# Patient Record
Sex: FEMALE | Race: WHITE | NOT HISPANIC OR LATINO | Employment: OTHER | URBAN - METROPOLITAN AREA
[De-identification: names, ages, dates, MRNs, and addresses within clinical notes are randomized per-mention and may not be internally consistent; named-entity substitution may affect disease eponyms.]

---

## 2017-12-13 ENCOUNTER — OFFICE VISIT (OUTPATIENT)
Dept: OBSTETRICS AND GYNECOLOGY | Facility: CLINIC | Age: 67
End: 2017-12-13

## 2017-12-13 VITALS
SYSTOLIC BLOOD PRESSURE: 102 MMHG | WEIGHT: 130 LBS | DIASTOLIC BLOOD PRESSURE: 60 MMHG | HEIGHT: 66 IN | BODY MASS INDEX: 20.89 KG/M2

## 2017-12-13 DIAGNOSIS — N83.202 CYSTS OF BOTH OVARIES: Primary | ICD-10-CM

## 2017-12-13 DIAGNOSIS — N83.201 CYSTS OF BOTH OVARIES: Primary | ICD-10-CM

## 2017-12-13 PROCEDURE — 99213 OFFICE O/P EST LOW 20 MIN: CPT | Performed by: OBSTETRICS & GYNECOLOGY

## 2017-12-13 RX ORDER — ATORVASTATIN CALCIUM 10 MG/1
10 TABLET, FILM COATED ORAL DAILY
COMMUNITY
End: 2018-01-08 | Stop reason: SDUPTHER

## 2017-12-13 RX ORDER — SERTRALINE HYDROCHLORIDE 100 MG/1
100 TABLET, FILM COATED ORAL DAILY
COMMUNITY
End: 2018-01-08 | Stop reason: SDUPTHER

## 2017-12-13 RX ORDER — ASPIRIN 81 MG/1
81 TABLET ORAL DAILY
COMMUNITY

## 2017-12-13 RX ORDER — PROPRANOLOL HYDROCHLORIDE 10 MG/1
10 TABLET ORAL 2 TIMES DAILY
COMMUNITY
End: 2018-01-08

## 2017-12-13 NOTE — PROGRESS NOTES
Subjective:    Patient Carin Rodriguez is a 67 y.o. female.   Chief Complaint   Patient presents with   • Ovarian Cyst     LT. OVARY. SEEN 6 WKS. AGO IN [place] FOR CYST.    CCThis patient presents having no pain no symptoms she is postmenopausal over 10 years had no bleeding and she presents with the history of having ovarian cysts on her ultrasound that they've been following for 6 months she apparently at her last visit had a new ovarian cyst  patient is unable to have a vaginal ultrasound because of the tight hymeneal ring is still present pelvic exam is the done and really could not appreciate any ovarian enlargement on either side uterus was small Camilla did a abdominal ultrasound the patient's bladder was not full but she did not see any large ovarian cysts on either side after long discussion with the patient I think what we will do is get a ultrasound at the hospital with the both abdominal and vaginal if possible that they would have a smaller probe patient is having some issues with vaginal dryness as far as just vaginal discomfort and some mild issues with the urinary urgency      HPI      The following portions of the patient's history were reviewed and updated as appropriate: allergies, current medications, past family history, past medical history, past social history, past surgical history and problem list.      Review of Systems   Constitutional: Negative.    HENT: Negative.    Eyes: Negative.    Respiratory: Negative.    Cardiovascular: Negative.    Gastrointestinal: Negative for abdominal distention, abdominal pain, anal bleeding, blood in stool, constipation, diarrhea, nausea, rectal pain and vomiting.   Endocrine: Negative for cold intolerance, heat intolerance, polydipsia, polyphagia and polyuria.   Genitourinary: Negative.  Negative for decreased urine volume, dyspareunia, dysuria, enuresis, flank pain, frequency, genital sores, hematuria, menstrual problem, pelvic pain, urgency, vaginal  bleeding, vaginal discharge and vaginal pain.   Musculoskeletal: Negative.    Skin: Negative.    Allergic/Immunologic: Negative.    Neurological: Negative.    Hematological: Negative for adenopathy. Does not bruise/bleed easily.   Psychiatric/Behavioral: Negative for agitation, confusion and sleep disturbance. The patient is not nervous/anxious.          Objective:      Physical Exam   Constitutional: She appears well-developed and well-nourished. She is not intubated.   HENT:   Head: Hair is normal.   Nose: Nose normal.   Mouth/Throat: Oropharynx is clear and moist.   Eyes: Conjunctivae are normal.   Neck: Normal carotid pulses and no JVD present. No tracheal tenderness, no spinous process tenderness and no muscular tenderness present. Carotid bruit is not present. No rigidity. No edema, no erythema and normal range of motion present. No thyroid mass and no thyromegaly present.   Cardiovascular: Normal rate, regular rhythm, S1 normal and normal heart sounds.  Exam reveals no gallop.    No murmur heard.  Pulmonary/Chest: Effort normal. No accessory muscle usage or stridor. No apnea, no tachypnea and no bradypnea. She is not intubated. No respiratory distress. She has no wheezes. She has no rales. She exhibits no tenderness. Right breast exhibits no inverted nipple, no mass, no nipple discharge, no skin change and no tenderness. Left breast exhibits no inverted nipple, no mass, no nipple discharge, no skin change and no tenderness.   Abdominal: Soft. Bowel sounds are normal. She exhibits no distension and no mass. There is no tenderness. There is no rebound and no guarding. No hernia.   Genitourinary: Vagina normal. Rectal exam shows no external hemorrhoid, no internal hemorrhoid, no fissure, no mass, no tenderness and anal tone normal. There is no rash, tenderness, lesion or injury on the right labia. There is no rash, tenderness, lesion or injury on the left labia. No erythema, tenderness or bleeding in the vagina.  No foreign body in the vagina. No signs of injury around the vagina. No vaginal discharge found.   Genitourinary Comments: Uterus has been removed   Musculoskeletal: She exhibits no edema or tenderness.        Right shoulder: She exhibits no tenderness, no swelling, no pain and no spasm.   Lymphadenopathy:        Head (right side): No submental, no submandibular, no tonsillar, no preauricular, no posterior auricular and no occipital adenopathy present.        Head (left side): No submental, no submandibular, no tonsillar, no preauricular, no posterior auricular and no occipital adenopathy present.     She has no cervical adenopathy.        Right cervical: No superficial cervical, no deep cervical and no posterior cervical adenopathy present.       Left cervical: No superficial cervical, no deep cervical and no posterior cervical adenopathy present.        Right axillary: No pectoral and no lateral adenopathy present.        Left axillary: No pectoral and no lateral adenopathy present.       Right: No inguinal, no supraclavicular and no epitrochlear adenopathy present.        Left: No inguinal, no supraclavicular and no epitrochlear adenopathy present.   Neurological: No cranial nerve deficit. Coordination normal.   Skin: Skin is warm and dry. No abrasion, no bruising, no burn, no lesion, no petechiae, no purpura and no rash noted. Rash is not macular, not maculopapular, not nodular and not urticarial. No cyanosis or erythema. No pallor. Nails show no clubbing.   Psychiatric: She has a normal mood and affect. Her behavior is normal.         Assessment and Plan:  Long discussion with the patient concerning the ovarian cysts which the patient the has been following with her gynecologist in [place] exam today did not reveal any ovarian enlargement and after long discussion with decided to get a ultrasound at the hospital to see if we can determine what the status of the ovarian cyst is and then make a plan after  reviewing the chart on follow-up patient's given Premarin vaginal cream to use 1 drop daily and told to use a and D ointment with the cream in the morning and the ointment at night patient has mild sugar issues she is on propanolol sertraline atorvastatin and aspirin and has had no other major issues    Patient has been instructed to perform a self breast exam on a weekly basis, a yearly mammogram, pap smear yearly unless instructed otherwise and bone density every 2 years.  I recommended that the patient not smoke, and discussed smoking cessation when appropriate.     Carin was seen today for ovarian cyst.    Diagnoses and all orders for this visit:    Cysts of both ovaries  -     US Pelvis Complete

## 2017-12-18 DIAGNOSIS — N93.9 ABNORMAL VAGINAL BLEEDING: Primary | ICD-10-CM

## 2017-12-19 ENCOUNTER — OFFICE VISIT (OUTPATIENT)
Dept: GASTROENTEROLOGY | Facility: CLINIC | Age: 67
End: 2017-12-19

## 2017-12-19 VITALS
DIASTOLIC BLOOD PRESSURE: 68 MMHG | TEMPERATURE: 97.9 F | BODY MASS INDEX: 30.09 KG/M2 | WEIGHT: 130 LBS | HEIGHT: 55 IN | SYSTOLIC BLOOD PRESSURE: 116 MMHG

## 2017-12-19 DIAGNOSIS — K52.839 MICROSCOPIC COLITIS, UNSPECIFIED MICROSCOPIC COLITIS TYPE: Primary | ICD-10-CM

## 2017-12-19 PROCEDURE — 99204 OFFICE O/P NEW MOD 45 MIN: CPT | Performed by: INTERNAL MEDICINE

## 2017-12-19 RX ORDER — CHOLESTYRAMINE 4 G/9G
4 POWDER, FOR SUSPENSION ORAL DAILY
Qty: 378 G | Refills: 5 | Status: SHIPPED | OUTPATIENT
Start: 2017-12-19 | End: 2019-07-24 | Stop reason: SDUPTHER

## 2017-12-19 NOTE — PROGRESS NOTES
Chief Complaint   Patient presents with   • microscopic colitis     Subjective      HPI     Carin Rodriguez is a 67 y.o. female who presents for evaluation of microscopic colitis.  Pt was initially diagnosed about 3-4 years ago.  Most recent colonoscopy in March 2016 (she thinks this was normal).    She has been on multiple cycles of Entacort.  She is currently taking Entacort 3mg/day, but has been on gradual taper.  She has flares every few months even on this regimen.  She states these typically last 1-2 days.  She is on metformin for DM which she has been on for last year. Weight has been stable.      Prior GI MD is:    Edilberto Fitzgerald  [address] [place] [id number]  [phone]      Past Medical History:   Diagnosis Date   • Diabetes mellitus    • Hyperlipidemia    • Microscopic colitis        Current Outpatient Prescriptions:   •  aspirin 81 MG EC tablet, Take 81 mg by mouth Daily., Disp: , Rfl:   •  atorvastatin (LIPITOR) 10 MG tablet, Take 10 mg by mouth Daily., Disp: , Rfl:   •  BUDESONIDE PO, Take  by mouth., Disp: , Rfl:   •  metFORMIN (GLUCOPHAGE) 1000 MG tablet, Take 1,000 mg by mouth 2 (Two) Times a Day With Meals., Disp: , Rfl:   •  propranolol (INDERAL) 10 MG tablet, Take 10 mg by mouth 2 (Two) Times a Day., Disp: , Rfl:   •  sertraline (ZOLOFT) 100 MG tablet, Take 100 mg by mouth Daily., Disp: , Rfl:   •  Bismuth Subsalicylate 262 MG tablet, Take 262 mg by mouth 3 (Three) Times a Day As Needed (for diarrhea)., Disp: 90 each, Rfl: 3  •  cholestyramine (QUESTRAN) 4 GM/DOSE powder, Take 1 packet by mouth Daily. mixed with a liquid, Disp: 378 g, Rfl: 5     Allergies   Allergen Reactions   • Clindamycin/Lincomycin      Social History     Social History   • Marital status:      Spouse name: N/A   • Number of children: N/A   • Years of education: N/A     Occupational History   • Not on file.     Social History Main Topics   • Smoking status: Never Smoker   • Smokeless tobacco: Never Used   •  "Alcohol use Yes      Comment: social   • Drug use: No   • Sexual activity: Not on file     Other Topics Concern   • Not on file     Social History Narrative     History reviewed. No pertinent family history.     Review of Systems   Constitutional: Negative.    Respiratory: Negative.    Cardiovascular: Negative.    Gastrointestinal: Positive for diarrhea.   All other systems reviewed and are negative.     Objective   Vitals:    12/19/17 1400   BP: 116/68   Temp: 97.9 °F (36.6 °C)     Physical Exam   Constitutional: She is oriented to person, place, and time. She appears well-developed and well-nourished.   HENT:   Head: Normocephalic and atraumatic.   Mouth/Throat: Oropharynx is clear and moist.   Eyes: EOM are normal. No scleral icterus.   Neck: Normal range of motion. Neck supple. No thyromegaly present.   Cardiovascular: Normal rate, regular rhythm and normal heart sounds.  Exam reveals no gallop and no friction rub.    No murmur heard.  Pulmonary/Chest: Effort normal and breath sounds normal. She has no wheezes. She has no rales. She exhibits no tenderness.   Abdominal: Soft. Bowel sounds are normal. She exhibits no distension. There is no tenderness. There is no rebound and no guarding. No hernia.   Musculoskeletal: Normal range of motion. She exhibits no edema.   Lymphadenopathy:     She has no cervical adenopathy.   Neurological: She is alert and oriented to person, place, and time.   Skin: Skin is warm and dry.   Psychiatric: She has a normal mood and affect. Judgment and thought content normal.   Vitals reviewed.       Assessment/Plan   Assessment:     1. Microscopic colitis, unspecified microscopic colitis type      Plan:   Pt reports hx of microscopic colitis treated with intermittent budesonide tapers.  She never been tried on any other therapies.  I am not convinced that the \"flares\" that she describes are truly related to microscopic colitis, as these seem to be rather short lived and intermittent.  She " also reports her last colonoscopy last year was normal.      I have suggested we wean her budesonide, and will start questran - initially once/day with option to increase to BID if she has frequent diarrhea on this therapy.  I have also given her rx for as needed bismuth tablets.  I have requested records from her prior GI MD.          Paul Mcmanus M.D.  [facility] ([facility]  [address] [place] [address]  Office: [phone]

## 2018-01-08 ENCOUNTER — OFFICE VISIT (OUTPATIENT)
Dept: FAMILY MEDICINE CLINIC | Facility: CLINIC | Age: 68
End: 2018-01-08

## 2018-01-08 VITALS
TEMPERATURE: 98 F | DIASTOLIC BLOOD PRESSURE: 68 MMHG | OXYGEN SATURATION: 95 % | BODY MASS INDEX: 21.66 KG/M2 | WEIGHT: 130 LBS | SYSTOLIC BLOOD PRESSURE: 110 MMHG | HEART RATE: 65 BPM | HEIGHT: 65 IN | RESPIRATION RATE: 16 BRPM

## 2018-01-08 DIAGNOSIS — F33.42 RECURRENT MAJOR DEPRESSIVE DISORDER, IN FULL REMISSION (HCC): ICD-10-CM

## 2018-01-08 DIAGNOSIS — E04.9 THYROID GOITER: ICD-10-CM

## 2018-01-08 DIAGNOSIS — R73.01 IMPAIRED FASTING GLUCOSE: Primary | ICD-10-CM

## 2018-01-08 DIAGNOSIS — I34.1 MVP (MITRAL VALVE PROLAPSE): ICD-10-CM

## 2018-01-08 DIAGNOSIS — E78.2 MIXED HYPERLIPIDEMIA: ICD-10-CM

## 2018-01-08 DIAGNOSIS — E55.9 VITAMIN D DEFICIENCY: ICD-10-CM

## 2018-01-08 PROBLEM — F32.A DEPRESSION: Status: ACTIVE | Noted: 2018-01-08

## 2018-01-08 PROBLEM — E78.5 HYPERLIPIDEMIA: Status: ACTIVE | Noted: 2018-01-08

## 2018-01-08 PROCEDURE — 99214 OFFICE O/P EST MOD 30 MIN: CPT | Performed by: PHYSICIAN ASSISTANT

## 2018-01-08 RX ORDER — SERTRALINE HYDROCHLORIDE 100 MG/1
100 TABLET, FILM COATED ORAL DAILY
Qty: 90 TABLET | Refills: 1 | Status: SHIPPED | OUTPATIENT
Start: 2018-01-08 | End: 2018-05-09 | Stop reason: SDUPTHER

## 2018-01-08 RX ORDER — PROPRANOLOL HCL 60 MG
60 CAPSULE, EXTENDED RELEASE 24HR ORAL DAILY
Qty: 90 CAPSULE | Refills: 1 | Status: SHIPPED | OUTPATIENT
Start: 2018-01-08 | End: 2018-05-09 | Stop reason: SDUPTHER

## 2018-01-08 RX ORDER — ATORVASTATIN CALCIUM 10 MG/1
10 TABLET, FILM COATED ORAL DAILY
Qty: 90 TABLET | Refills: 1 | Status: SHIPPED | OUTPATIENT
Start: 2018-01-08 | End: 2018-05-09 | Stop reason: SDUPTHER

## 2018-01-08 NOTE — PATIENT INSTRUCTIONS
Low glycemic index diet  Exercise 30 minutes most days of the week  Make sure you get results on any labs or tests we ordered today  We discussed medications and how to take them as prescribed  Sleep 6-8 hours each night if possible  If you have not signed up for [employer], please activate your code ASAP from your After Visit Summary today    LDL goal <100  LDL goal if heart disease <70  HDL goal >60  Triglyceride goal <150  BP goal =<130/80  Fasting glucose <100

## 2018-01-08 NOTE — PROGRESS NOTES
Subjective   Carin Rodriguez is a 67 y.o. female.     History of Present Illness   Carin Rodriguez 67 y.o. female who presents today for a new patient appointment.    she has a history of   Patient Active Problem List   Diagnosis   • Impaired fasting glucose   • Depression   • Hyperlipidemia   • Thyroid goiter   • MVP (mitral valve prolapse)   .  she is here to establish care I reviewed the PFSH recorded today by my MA/LPN staff.   she   She has been feeling well.    She has UTI and finishing Cipro;  Blood on urine dip and will get f/u urine  Hx of SVT and is on Propranolol with ASA;  No SVT for years; no ablation    Carin Rodriguez 67 y.o. female who presents today for routine follow up check and medication refills.  she has a history of   Patient Active Problem List   Diagnosis   • Impaired fasting glucose   • Depression   • Hyperlipidemia   • Thyroid goiter   • MVP (mitral valve prolapse)   .  She is new today, she has overall felt well.  She has has been having elevated blood pressure readings and here to evaluate this, Impaired fasting glucose and will continue close lab follow up to watch for DMII, Hyperlipidemia and here to discuss treatment and Vitamin D deficiency and well controlled on medication and labs at goal >30.  she has been compliant with current medications have reviewed them.  The patient denies medication side effects.  Doing better on new GI Rx Questran    Results for orders placed or performed during the hospital encounter of 01/01/18   Urine Culture - Urine, Urine, Clean Catch   Result Value Ref Range    Urine Culture Final report (A)     Result 1 Citrobacter youngae (A)     Susceptibility Testing Comment    POCT Urinalysis (automated dipstick)   Result Value Ref Range    Color Dark Yellow Yellow, Straw, Dark Yellow, Doretha    Clarity, UA Cloudy (A) Clear    Glucose, UA Negative Negative, 1000 mg/dL (3+) mg/dL    Bilirubin Negative Negative    Ketones, UA Negative Negative    Specific Gravity   1.010 1.005 - 1.030    Blood, UA Large (A) Negative    pH, Urine 6.0 5.0 - 8.0    Protein, POC Trace (A) Negative mg/dL    Urobilinogen, UA Normal Normal    Leukocytes Moderate (2+) (A) Negative    Nitrite, UA Negative Negative     Hx thyroid goiter 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  Old records have MVP noted    Seeing GI and GYN  I saw records from GI, GYN, ortho only  Well controlled on Zoloft for years for depression.     She is not exercising; no sleep apnea    She has had one pneumonia vaccine  Will get thyroid update next Oct    The following portions of the patient's history were reviewed and updated as appropriate: allergies, current medications, past family history, past medical history, past social history, past surgical history and problem list.    Review of Systems   Constitutional: Negative for activity change, appetite change and unexpected weight change.   HENT: Negative for nosebleeds and trouble swallowing.    Eyes: Negative for pain and visual disturbance.   Respiratory: Negative for chest tightness, shortness of breath and wheezing.    Cardiovascular: Negative for chest pain and palpitations.   Gastrointestinal: Negative for abdominal pain and blood in stool.   Endocrine: Negative.    Genitourinary: Negative for difficulty urinating and hematuria.   Musculoskeletal: Negative for joint swelling.   Skin: Negative for color change and rash.   Allergic/Immunologic: Negative.    Neurological: Negative for syncope and speech difficulty.   Hematological: Negative for adenopathy.   Psychiatric/Behavioral: Negative for agitation and confusion.   All other systems reviewed and are negative.      Objective   Physical Exam   Constitutional: She is oriented to person, place, and time. She appears well-developed and well-nourished. No distress.   HENT:   Head: Normocephalic and atraumatic.   Right Ear: External ear normal.   Left Ear: External ear normal.   Nose: Nose normal.    Mouth/Throat: Oropharynx is clear and moist. No oropharyngeal exudate.   Eyes: Conjunctivae and EOM are normal. Pupils are equal, round, and reactive to light. No scleral icterus.   Neck: Normal range of motion. Neck supple. Thyromegaly present.   Cardiovascular: Normal rate, regular rhythm, normal heart sounds and intact distal pulses.    No murmur heard.  Pulmonary/Chest: Effort normal and breath sounds normal. No respiratory distress. She has no wheezes. She has no rales.   Abdominal: Soft.   Musculoskeletal: Normal range of motion. She exhibits no deformity.   Lymphadenopathy:     She has no cervical adenopathy.   Neurological: She is alert and oriented to person, place, and time. Coordination normal.   Skin: Skin is warm and dry.   Psychiatric: She has a normal mood and affect. Her behavior is normal. Judgment and thought content normal.   Nursing note and vitals reviewed.      Assessment/Plan   Carin was seen today for establish care and depression.    Diagnoses and all orders for this visit:    Impaired fasting glucose  -     Comprehensive metabolic panel  -     Lipid panel  -     CBC and Differential  -     TSH  -     T4, Free  -     Vitamin B12  -     Folate  -     Vitamin D 25 Hydroxy  -     Urinalysis With Microscopic - Urine, Clean Catch  -     Hemoglobin A1c  -     Hepatitis C Antibody    Recurrent major depressive disorder, in full remission  -     Comprehensive metabolic panel  -     Lipid panel  -     CBC and Differential  -     TSH  -     T4, Free  -     Vitamin B12  -     Folate  -     Vitamin D 25 Hydroxy  -     Urinalysis With Microscopic - Urine, Clean Catch  -     Hemoglobin A1c  -     Hepatitis C Antibody    Mixed hyperlipidemia  -     Comprehensive metabolic panel  -     Lipid panel  -     CBC and Differential  -     TSH  -     T4, Free  -     Vitamin B12  -     Folate  -     Vitamin D 25 Hydroxy  -     Urinalysis With Microscopic - Urine, Clean Catch  -     Hemoglobin A1c  -      Hepatitis C Antibody    Thyroid goiter  -     Ambulatory Referral to ENT (Otolaryngology)  -     Comprehensive metabolic panel  -     Lipid panel  -     CBC and Differential  -     TSH  -     T4, Free  -     Vitamin B12  -     Folate  -     Vitamin D 25 Hydroxy  -     Urinalysis With Microscopic - Urine, Clean Catch  -     Hemoglobin A1c  -     Hepatitis C Antibody    MVP (mitral valve prolapse)  -     Comprehensive metabolic panel  -     Lipid panel  -     CBC and Differential  -     TSH  -     T4, Free  -     Vitamin B12  -     Folate  -     Vitamin D 25 Hydroxy  -     Urinalysis With Microscopic - Urine, Clean Catch  -     Hemoglobin A1c  -     Hepatitis C Antibody  -     Adult Transthoracic Echo Complete W/ Cont if Necessary Per Protocol; Future    Vitamin D deficiency  -     Comprehensive metabolic panel  -     Lipid panel  -     CBC and Differential  -     TSH  -     T4, Free  -     Vitamin B12  -     Folate  -     Vitamin D 25 Hydroxy  -     Urinalysis With Microscopic - Urine, Clean Catch  -     Hemoglobin A1c  -     Hepatitis C Antibody    Other orders  -     propranolol LA (INDERAL LA) 60 MG 24 hr capsule; Take 1 capsule by mouth Daily. For heart (put on file)  -     sertraline (ZOLOFT) 100 MG tablet; Take 1 tablet by mouth Daily. For depression; (put on file)  -     atorvastatin (LIPITOR) 10 MG tablet; Take 1 tablet by mouth Daily. For cholesterol;(put on file)  -     metFORMIN (GLUCOPHAGE) 1000 MG tablet; Take 1 tablet by mouth 2 (Two) Times a Day With Meals. For impaired glucose; (put on file)

## 2018-01-17 ENCOUNTER — APPOINTMENT (OUTPATIENT)
Dept: CARDIOLOGY | Facility: HOSPITAL | Age: 68
End: 2018-01-17

## 2018-01-24 ENCOUNTER — OFFICE VISIT (OUTPATIENT)
Dept: GASTROENTEROLOGY | Facility: CLINIC | Age: 68
End: 2018-01-24

## 2018-01-24 VITALS
TEMPERATURE: 97.7 F | WEIGHT: 133.2 LBS | HEIGHT: 65 IN | DIASTOLIC BLOOD PRESSURE: 72 MMHG | BODY MASS INDEX: 22.19 KG/M2 | SYSTOLIC BLOOD PRESSURE: 118 MMHG

## 2018-01-24 DIAGNOSIS — K58.0 IRRITABLE BOWEL SYNDROME WITH DIARRHEA: Primary | ICD-10-CM

## 2018-01-24 PROCEDURE — 99213 OFFICE O/P EST LOW 20 MIN: CPT | Performed by: INTERNAL MEDICINE

## 2018-01-24 NOTE — PROGRESS NOTES
Chief Complaint   Patient presents with   • microscopic colitis     Subjective     HPI     Carin Rodriguez is a 67 y.o. female who presents Today for follow-up.  This patient was seen as a new consult a few months ago after relocating to the area needing to establish GI care.  She reported a history of microscopic colitis and has been on off and on budesonide tapers over the last few years.  I did eventually received and reviewed records from her prior gastroenterologist in [place].  The patient most recently had upper and lower endoscopy in March 2016 due to symptoms of ongoing diarrhea and concern for active microscopic colitis.  I reviewed those reports in both the EGD and colonoscopy were normal both macro-and microscopically.  There was no evidence of active microscopic colitis.    At her last visit I had the patient taper off her budesonide and she was started on cholestyramine with as needed Pepto-Bismol.  She tells me that she's been doing relatively well on this regimen.  She still has some intermittent loose stool requiring use of a undergarment pad but this seems to be a somewhat less frequent.  Her weight remains stable.  I did again discuss with the patient that she is on metformin and loose stool can be a very common side effect from this medication.    Past Medical History:   Diagnosis Date   • Depression    • Heart palpitations    • Hyperlipidemia    • Microscopic colitis        Social History     Social History   • Marital status:      Spouse name: N/A   • Number of children: N/A   • Years of education: N/A     Social History Main Topics   • Smoking status: Never Smoker   • Smokeless tobacco: Never Used   • Alcohol use Yes      Comment: social   • Drug use: No   • Sexual activity: Not Asked     Other Topics Concern   • None     Social History Narrative       Current Outpatient Prescriptions:   •  aspirin 81 MG EC tablet, Take 81 mg by mouth Daily., Disp: , Rfl:   •  atorvastatin  (LIPITOR) 10 MG tablet, Take 1 tablet by mouth Daily. For cholesterol;(put on file), Disp: 90 tablet, Rfl: 1  •  Bismuth Subsalicylate 262 MG tablet, Take 262 mg by mouth 3 (Three) Times a Day As Needed (for diarrhea)., Disp: 90 each, Rfl: 3  •  cholestyramine (QUESTRAN) 4 GM/DOSE powder, Take 1 packet by mouth Daily. mixed with a liquid, Disp: 378 g, Rfl: 5  •  metFORMIN (GLUCOPHAGE) 1000 MG tablet, Take 1 tablet by mouth 2 (Two) Times a Day With Meals. For impaired glucose; (put on file), Disp: 180 tablet, Rfl: 1  •  Multiple Vitamins-Minerals (MULTIVITAMIN ADULT PO), Take  by mouth., Disp: , Rfl:   •  propranolol LA (INDERAL LA) 60 MG 24 hr capsule, Take 1 capsule by mouth Daily. For heart (put on file), Disp: 90 capsule, Rfl: 1  •  sertraline (ZOLOFT) 100 MG tablet, Take 1 tablet by mouth Daily. For depression; (put on file), Disp: 90 tablet, Rfl: 1  •  BUDESONIDE PO, Take  by mouth., Disp: , Rfl:   •  ciprofloxacin (CIPRO) 250 MG tablet, Take 1 tablet by mouth Every 12 (Twelve) Hours., Disp: 10 tablet, Rfl: 0  •  rifaximin (XIFAXAN) 550 MG tablet, Take 1 tablet by mouth Every 8 (Eight) Hours for 14 days., Disp: 42 tablet, Rfl: 0    Review of Systems   Constitutional: Negative.    Cardiovascular: Negative.    Gastrointestinal: Positive for diarrhea.       Objective   Vitals:    01/24/18 0920   BP: 118/72   Temp: 97.7 °F (36.5 °C)       Physical Exam   Constitutional: She is oriented to person, place, and time. She appears well-developed and well-nourished.   HENT:   Head: Normocephalic and atraumatic.   Abdominal: Soft. Bowel sounds are normal. She exhibits no distension and no mass. There is no tenderness. No hernia.   Neurological: She is alert and oriented to person, place, and time.   Skin: Skin is warm and dry.   Psychiatric: She has a normal mood and affect. Her behavior is normal. Judgment and thought content normal.   Vitals reviewed.      Assessment/Plan   Assessment:     1. Irritable bowel syndrome  with diarrhea    2.      Remote hx of microscopic colitis (normal random bx in 2016)    Plan:   Trail fo xifaxin tid x 14 days  Continue questran and as needed pepto  Consider discontinuation of metformin  Screening colonoscopy due 2026    Follow up 3 mos        Paul Mcmanus M.D.  [facility]  [address] [place] [address]  Office: [phone]

## 2018-01-25 ENCOUNTER — HOSPITAL ENCOUNTER (OUTPATIENT)
Dept: CARDIOLOGY | Facility: HOSPITAL | Age: 68
Discharge: HOME | End: 2018-01-25
Admitting: PHYSICIAN ASSISTANT

## 2018-01-25 ENCOUNTER — TELEPHONE (OUTPATIENT)
Dept: GASTROENTEROLOGY | Facility: CLINIC | Age: 68
End: 2018-01-25

## 2018-01-25 VITALS
HEIGHT: 65 IN | WEIGHT: 133 LBS | SYSTOLIC BLOOD PRESSURE: 112 MMHG | HEART RATE: 64 BPM | DIASTOLIC BLOOD PRESSURE: 60 MMHG | BODY MASS INDEX: 22.16 KG/M2

## 2018-01-25 DIAGNOSIS — I34.1 MVP (MITRAL VALVE PROLAPSE): ICD-10-CM

## 2018-01-25 LAB
ASCENDING AORTA: 2.7 CM
BH CV ECHO MEAS - ACS: 2 CM
BH CV ECHO MEAS - AO MAX PG (FULL): 2.2 MMHG
BH CV ECHO MEAS - AO MAX PG: 6.6 MMHG
BH CV ECHO MEAS - AO MEAN PG (FULL): 1.3 MMHG
BH CV ECHO MEAS - AO MEAN PG: 3.5 MMHG
BH CV ECHO MEAS - AO ROOT AREA (BSA CORRECTED): 1.9
BH CV ECHO MEAS - AO ROOT AREA: 7.8 CM^2
BH CV ECHO MEAS - AO ROOT DIAM: 3.1 CM
BH CV ECHO MEAS - AO V2 MAX: 128 CM/SEC
BH CV ECHO MEAS - AO V2 MEAN: 85.7 CM/SEC
BH CV ECHO MEAS - AO V2 VTI: 30.8 CM
BH CV ECHO MEAS - AVA(I,A): 1.9 CM^2
BH CV ECHO MEAS - AVA(I,D): 1.9 CM^2
BH CV ECHO MEAS - AVA(V,A): 2 CM^2
BH CV ECHO MEAS - AVA(V,D): 2 CM^2
BH CV ECHO MEAS - BSA(HAYCOCK): 1.6 M^2
BH CV ECHO MEAS - BSA: 1.6 M^2
BH CV ECHO MEAS - BZI_BMI: 21.3 KILOGRAMS/M^2
BH CV ECHO MEAS - BZI_METRIC_HEIGHT: 165.1 CM
BH CV ECHO MEAS - BZI_METRIC_WEIGHT: 58.1 KG
BH CV ECHO MEAS - CONTRAST EF 4CH: 62.7 ML/M^2
BH CV ECHO MEAS - EDV(MOD-SP4): 51 ML
BH CV ECHO MEAS - EDV(TEICH): 95.9 ML
BH CV ECHO MEAS - EF(CUBED): 74 %
BH CV ECHO MEAS - EF(MOD-SP4): 62.7 %
BH CV ECHO MEAS - EF(TEICH): 65.9 %
BH CV ECHO MEAS - ESV(MOD-SP4): 19 ML
BH CV ECHO MEAS - ESV(TEICH): 32.7 ML
BH CV ECHO MEAS - FS: 36.1 %
BH CV ECHO MEAS - IVS/LVPW: 1.1
BH CV ECHO MEAS - IVSD: 1 CM
BH CV ECHO MEAS - LAT PEAK E' VEL: 9 CM/SEC
BH CV ECHO MEAS - LV DIASTOLIC VOL/BSA (35-75): 31.2 ML/M^2
BH CV ECHO MEAS - LV MASS(C)D: 148.7 GRAMS
BH CV ECHO MEAS - LV MASS(C)DI: 90.9 GRAMS/M^2
BH CV ECHO MEAS - LV MAX PG: 4.3 MMHG
BH CV ECHO MEAS - LV MEAN PG: 2.2 MMHG
BH CV ECHO MEAS - LV SYSTOLIC VOL/BSA (12-30): 11.6 ML/M^2
BH CV ECHO MEAS - LV V1 MAX: 103.8 CM/SEC
BH CV ECHO MEAS - LV V1 MEAN: 68.6 CM/SEC
BH CV ECHO MEAS - LV V1 VTI: 23.7 CM
BH CV ECHO MEAS - LVIDD: 4.6 CM
BH CV ECHO MEAS - LVIDS: 2.9 CM
BH CV ECHO MEAS - LVLD AP4: 6.4 CM
BH CV ECHO MEAS - LVLS AP4: 5.5 CM
BH CV ECHO MEAS - LVOT AREA (M): 2.5 CM^2
BH CV ECHO MEAS - LVOT AREA: 2.5 CM^2
BH CV ECHO MEAS - LVOT DIAM: 1.8 CM
BH CV ECHO MEAS - LVPWD: 0.92 CM
BH CV ECHO MEAS - MED PEAK E' VEL: 8 CM/SEC
BH CV ECHO MEAS - MV A DUR: 0.11 SEC
BH CV ECHO MEAS - MV A MAX VEL: 84.9 CM/SEC
BH CV ECHO MEAS - MV DEC SLOPE: 415.6 CM/SEC^2
BH CV ECHO MEAS - MV DEC TIME: 0.21 SEC
BH CV ECHO MEAS - MV E MAX VEL: 91.7 CM/SEC
BH CV ECHO MEAS - MV E/A: 1.1
BH CV ECHO MEAS - MV MAX PG: 4.4 MMHG
BH CV ECHO MEAS - MV MEAN PG: 2 MMHG
BH CV ECHO MEAS - MV P1/2T MAX VEL: 89.2 CM/SEC
BH CV ECHO MEAS - MV P1/2T: 62.9 MSEC
BH CV ECHO MEAS - MV V2 MAX: 104.7 CM/SEC
BH CV ECHO MEAS - MV V2 MEAN: 67.4 CM/SEC
BH CV ECHO MEAS - MV V2 VTI: 33 CM
BH CV ECHO MEAS - MVA P1/2T LCG: 2.5 CM^2
BH CV ECHO MEAS - MVA(P1/2T): 3.5 CM^2
BH CV ECHO MEAS - MVA(VTI): 1.8 CM^2
BH CV ECHO MEAS - PA MAX PG (FULL): 2.8 MMHG
BH CV ECHO MEAS - PA MAX PG: 4.3 MMHG
BH CV ECHO MEAS - PA V2 MAX: 103.7 CM/SEC
BH CV ECHO MEAS - PULM A REVS DUR: 0.12 SEC
BH CV ECHO MEAS - PULM A REVS VEL: 37.3 CM/SEC
BH CV ECHO MEAS - PULM DIAS VEL: 51.4 CM/SEC
BH CV ECHO MEAS - PULM S/D: 1.4
BH CV ECHO MEAS - PULM SYS VEL: 72.3 CM/SEC
BH CV ECHO MEAS - PVA(V,A): 3 CM^2
BH CV ECHO MEAS - PVA(V,D): 3 CM^2
BH CV ECHO MEAS - QP/QS: 1.2
BH CV ECHO MEAS - RAP SYSTOLE: 3 MMHG
BH CV ECHO MEAS - RV MAX PG: 1.5 MMHG
BH CV ECHO MEAS - RV MEAN PG: 0.76 MMHG
BH CV ECHO MEAS - RV V1 MAX: 61.6 CM/SEC
BH CV ECHO MEAS - RV V1 MEAN: 38.9 CM/SEC
BH CV ECHO MEAS - RV V1 VTI: 14.4 CM
BH CV ECHO MEAS - RVOT AREA: 5 CM^2
BH CV ECHO MEAS - RVOT DIAM: 2.5 CM
BH CV ECHO MEAS - RVSP: 17 MMHG
BH CV ECHO MEAS - SI(AO): 146.1 ML/M^2
BH CV ECHO MEAS - SI(CUBED): 43.2 ML/M^2
BH CV ECHO MEAS - SI(LVOT): 36.2 ML/M^2
BH CV ECHO MEAS - SI(MOD-SP4): 19.6 ML/M^2
BH CV ECHO MEAS - SI(TEICH): 38.6 ML/M^2
BH CV ECHO MEAS - SUP REN AO DIAM: 2 CM
BH CV ECHO MEAS - SV(AO): 239.1 ML
BH CV ECHO MEAS - SV(CUBED): 70.6 ML
BH CV ECHO MEAS - SV(LVOT): 59.2 ML
BH CV ECHO MEAS - SV(MOD-SP4): 32 ML
BH CV ECHO MEAS - SV(RVOT): 71.7 ML
BH CV ECHO MEAS - SV(TEICH): 63.2 ML
BH CV ECHO MEAS - TAPSE (>1.6): 2.4 CM2
BH CV ECHO MEAS - TR MAX VEL: 186.2 CM/SEC
BH CV XLRA - RV BASE: 2.5 CM
BH CV XLRA - TDI S': 15 CM/SEC
E/E' RATIO: 11
LEFT ATRIUM VOLUME INDEX: 22 ML/M2
LV EF 2D ECHO EST: 63 %
SINUS: 2.7 CM
STJ: 2.9 CM

## 2018-01-25 PROCEDURE — 93306 TTE W/DOPPLER COMPLETE: CPT

## 2018-01-25 PROCEDURE — 93306 TTE W/DOPPLER COMPLETE: CPT | Performed by: INTERNAL MEDICINE

## 2018-01-25 NOTE — TELEPHONE ENCOUNTER
Returned patient's phone call. She states Xifaxan will cost her $600 oop. Questions if there is another medication she can take which will be cheaper. Advised an update will be sent to Dr. Perez. She verb understanding.

## 2018-01-25 NOTE — TELEPHONE ENCOUNTER
----- Message from Drake Brafdord sent at 1/25/2018 12:39 PM EST -----  Regarding: PT CALLED ABOUT MEDS   Contact: [phone]   PT IS CALLING STATING THE MEDICATION rifaximin (XIFAXAN) 550 MG tablet IS WAY TO EXPENSIVE FOR THE PT TO GET.

## 2018-01-26 NOTE — TELEPHONE ENCOUNTER
Called the Provider Prior Authorization Line and spoke with representative. PA for tier exception submitted. We will hear back via fax within 72 hours (weekend days included).     Called pt and advised of the above. She verb understanding.

## 2018-01-26 NOTE — TELEPHONE ENCOUNTER
Called pt and advised that per Dr Mcmanus: there is not a good alternative to Xifaxan. Advised that MD recommends she can try over the counter IBgard is she has not tried that medication yet. Pt verb understanding and will try IBgard.

## 2018-01-26 NOTE — TELEPHONE ENCOUNTER
Per a staff message, pt called back again with questions about medication and cost.    Called pt back. Pt states she spoke with her insurance company and she was told that the Dr's office could call [phone] and request the medication be lowered to another tier. Med is a tier 5 currently. Advised will call and see what I can do for her. Pt verb understanding.

## 2018-01-29 LAB
25(OH)D3+25(OH)D2 SERPL-MCNC: 49.5 NG/ML (ref 30–100)
ALBUMIN SERPL-MCNC: 4.5 G/DL (ref 3.6–4.8)
ALBUMIN/GLOB SERPL: 1.7 {RATIO} (ref 1.2–2.2)
ALP SERPL-CCNC: 78 IU/L (ref 39–117)
ALT SERPL-CCNC: 17 IU/L (ref 0–32)
APPEARANCE UR: (no result)
AST SERPL-CCNC: 19 IU/L (ref 0–40)
BACTERIA #/AREA URNS HPF: ABNORMAL /[HPF]
BASOPHILS # BLD AUTO: 0.1 X10E3/UL (ref 0–0.2)
BASOPHILS NFR BLD AUTO: 2 %
BILIRUB SERPL-MCNC: 0.2 MG/DL (ref 0–1.2)
BILIRUB UR QL STRIP: NEGATIVE
BUN SERPL-MCNC: 20 MG/DL (ref 8–27)
BUN/CREAT SERPL: 29 (ref 12–28)
CALCIUM SERPL-MCNC: 9.6 MG/DL (ref 8.7–10.3)
CHLORIDE SERPL-SCNC: 99 MMOL/L (ref 96–106)
CHOLEST SERPL-MCNC: 170 MG/DL (ref 100–199)
CO2 SERPL-SCNC: 28 MMOL/L (ref 18–29)
COLOR UR: YELLOW
CREAT SERPL-MCNC: 0.69 MG/DL (ref 0.57–1)
CRYSTALS URNS MICRO: ABNORMAL
EOSINOPHIL # BLD AUTO: 1.4 X10E3/UL (ref 0–0.4)
EOSINOPHIL NFR BLD AUTO: 23 %
EPI CELLS #/AREA URNS HPF: ABNORMAL /HPF
ERYTHROCYTE [DISTWIDTH] IN BLOOD BY AUTOMATED COUNT: 14 % (ref 12.3–15.4)
FOLATE SERPL-MCNC: >20 NG/ML
GFR SERPLBLD CREATININE-BSD FMLA CKD-EPI: 106 ML/MIN/1.73
GFR SERPLBLD CREATININE-BSD FMLA CKD-EPI: 92 ML/MIN/1.73
GLOBULIN SER CALC-MCNC: 2.7 G/DL (ref 1.5–4.5)
GLUCOSE SERPL-MCNC: 94 MG/DL (ref 65–99)
GLUCOSE UR QL: NEGATIVE
HBA1C MFR BLD: 6.1 % (ref 4.8–5.6)
HCT VFR BLD AUTO: 40 % (ref 34–46.6)
HCV AB S/CO SERPL IA: 0.1 S/CO RATIO (ref 0–0.9)
HDLC SERPL-MCNC: 52 MG/DL
HGB BLD-MCNC: 12.9 G/DL (ref 11.1–15.9)
HGB UR QL STRIP: NEGATIVE
IMM GRANULOCYTES # BLD: 0 X10E3/UL (ref 0–0.1)
IMM GRANULOCYTES NFR BLD: 0 %
KETONES UR QL STRIP: NEGATIVE
LDLC SERPL CALC-MCNC: 90 MG/DL (ref 0–99)
LEUKOCYTE ESTERASE UR QL STRIP: (no result)
LYMPHOCYTES # BLD AUTO: 1.4 X10E3/UL (ref 0.7–3.1)
LYMPHOCYTES NFR BLD AUTO: 23 %
MCH RBC QN AUTO: 29.5 PG (ref 26.6–33)
MCHC RBC AUTO-ENTMCNC: 32.3 G/DL (ref 31.5–35.7)
MCV RBC AUTO: 92 FL (ref 79–97)
MICRO URNS: (no result)
MONOCYTES # BLD AUTO: 0.5 X10E3/UL (ref 0.1–0.9)
MONOCYTES NFR BLD AUTO: 8 %
MORPHOLOGY BLD-IMP: (no result)
MUCOUS THREADS URNS QL MICRO: PRESENT
NEUTROPHILS # BLD AUTO: 2.6 X10E3/UL (ref 1.4–7)
NEUTROPHILS NFR BLD AUTO: 44 %
NITRITE UR QL STRIP: NEGATIVE
PH UR STRIP: 6.5 [PH] (ref 5–7.5)
PLATELET # BLD AUTO: 221 X10E3/UL (ref 150–379)
POTASSIUM SERPL-SCNC: 4.8 MMOL/L (ref 3.5–5.2)
PROT SERPL-MCNC: 7.2 G/DL (ref 6–8.5)
PROT UR QL STRIP: NEGATIVE
RBC # BLD AUTO: 4.37 X10E6/UL (ref 3.77–5.28)
RBC #/AREA URNS HPF: ABNORMAL /HPF
SODIUM SERPL-SCNC: 142 MMOL/L (ref 134–144)
SP GR UR: 1.02 (ref 1–1.03)
T4 FREE SERPL-MCNC: 1.08 NG/DL (ref 0.82–1.77)
TRIGL SERPL-MCNC: 142 MG/DL (ref 0–149)
TSH SERPL DL<=0.005 MIU/L-ACNC: 2.82 UIU/ML (ref 0.45–4.5)
UNIDENT CRYS URNS QL MICRO: PRESENT
UROBILINOGEN UR STRIP-MCNC: 0.2 MG/DL (ref 0.2–1)
VIT B12 SERPL-MCNC: 828 PG/ML (ref 232–1245)
VLDLC SERPL CALC-MCNC: 28 MG/DL (ref 5–40)
WBC # BLD AUTO: 5.9 X10E3/UL (ref 3.4–10.8)
WBC #/AREA URNS HPF: ABNORMAL /HPF

## 2018-01-30 ENCOUNTER — TELEPHONE (OUTPATIENT)
Dept: GASTROENTEROLOGY | Facility: CLINIC | Age: 68
End: 2018-01-30

## 2018-01-30 RX ORDER — METFORMIN HYDROCHLORIDE EXTENDED-RELEASE TABLETS 1000 MG/1
TABLET, FILM COATED, EXTENDED RELEASE ORAL
Qty: 180 TABLET | Refills: 1 | Status: SHIPPED | OUTPATIENT
Start: 2018-01-30 | End: 2018-02-07

## 2018-01-30 NOTE — TELEPHONE ENCOUNTER
----- Message from Drake Bradford sent at 1/30/2018  9:17 AM EST -----  Regarding: PT CALLED AGAIN ABOUT MEDS   Contact: [phone]  ...

## 2018-01-30 NOTE — TELEPHONE ENCOUNTER
Returned patient's phone call. She questions if we have heard anything regarding the tier exception for her medication. Advised we had not.

## 2018-02-06 ENCOUNTER — TELEPHONE (OUTPATIENT)
Dept: GASTROENTEROLOGY | Facility: CLINIC | Age: 68
End: 2018-02-06

## 2018-02-07 RX ORDER — METFORMIN HYDROCHLORIDE 500 MG/1
2000 TABLET, EXTENDED RELEASE ORAL
Qty: 120 TABLET | Refills: 5 | Status: SHIPPED | OUTPATIENT
Start: 2018-02-07 | End: 2018-05-09 | Stop reason: SDUPTHER

## 2018-02-21 ENCOUNTER — PROCEDURE VISIT (OUTPATIENT)
Dept: OBSTETRICS AND GYNECOLOGY | Age: 68
End: 2018-02-21

## 2018-02-21 ENCOUNTER — OFFICE VISIT (OUTPATIENT)
Dept: OBSTETRICS AND GYNECOLOGY | Age: 68
End: 2018-02-21

## 2018-02-21 VITALS
HEIGHT: 66 IN | SYSTOLIC BLOOD PRESSURE: 100 MMHG | DIASTOLIC BLOOD PRESSURE: 60 MMHG | BODY MASS INDEX: 21.05 KG/M2 | WEIGHT: 131 LBS

## 2018-02-21 DIAGNOSIS — N83.202 CYSTS OF BOTH OVARIES: Primary | ICD-10-CM

## 2018-02-21 DIAGNOSIS — Z87.42 HISTORY OF OVARIAN CYST: Primary | ICD-10-CM

## 2018-02-21 DIAGNOSIS — N83.201 CYSTS OF BOTH OVARIES: Primary | ICD-10-CM

## 2018-02-21 DIAGNOSIS — Z13.89 SCREENING FOR BLOOD OR PROTEIN IN URINE: ICD-10-CM

## 2018-02-21 DIAGNOSIS — N95.2 VAGINAL ATROPHY: ICD-10-CM

## 2018-02-21 PROCEDURE — 99212 OFFICE O/P EST SF 10 MIN: CPT | Performed by: OBSTETRICS & GYNECOLOGY

## 2018-02-21 PROCEDURE — 76830 TRANSVAGINAL US NON-OB: CPT | Performed by: OBSTETRICS & GYNECOLOGY

## 2018-02-21 RX ORDER — CHOLESTYRAMINE 4 G/9G
POWDER, FOR SUSPENSION ORAL
Refills: 5 | COMMUNITY
Start: 2018-01-24 | End: 2018-02-21 | Stop reason: SDUPTHER

## 2018-02-21 RX ORDER — CYCLOSPORINE 0.5 MG/ML
EMULSION OPHTHALMIC
Refills: 3 | COMMUNITY
Start: 2018-01-24

## 2018-02-21 RX ORDER — ZOLPIDEM TARTRATE 5 MG/1
TABLET ORAL
Refills: 5 | COMMUNITY
Start: 2017-12-20

## 2018-02-21 RX ORDER — TRIAMCINOLONE ACETONIDE 0.1 %
PASTE (GRAM) DENTAL
Refills: 2 | COMMUNITY
Start: 2017-11-28 | End: 2018-07-07

## 2018-02-21 NOTE — PROGRESS NOTES
"Subjective   Carin Rodriguez is a 67 y.o. female who presents for new pt, cyst on ovaries, pt saw Dr Rasheed at [facility] on 12/13/17 - here for annual - although last annual in October in [place] - reports followed for ovarian cyst in [place] - US reviewed from 11/2017 in [place] -5.6 cm uterus with normal ovaries and 1.5 cm left ovarian cyst - 10/23/17 Mammogram negative - pap negative 11/2/17 - US today reveals normal uterus and 2-3 cm fibroid - unable to perform TVUS - ovaries not visualized - had US with Dr. Sanchez's office in 12/2017 that revealed same     Advised patient to return in November for annual - patient reports inability to have speculum exam, TVUS and/or intercourse due to vaginal pain and atrophy - rec using premarin until return in November  .     History of Present Illness    The following portions of the patient's history were reviewed and updated as appropriate: allergies, current medications, past family history, past medical history, past social history, past surgical history and problem list.    Review of Systems   Constitutional: Negative for chills and fever.   Gastrointestinal: Negative for abdominal distention and abdominal pain.   Genitourinary: Negative for dysuria, pelvic pain, vaginal bleeding, vaginal discharge and vaginal pain.   All other systems reviewed and are negative.    /60  Ht 166.4 cm (65.5\")  Wt 59.4 kg (131 lb)  BMI 21.47 kg/m2    Objective   Physical Exam   Constitutional: She is oriented to person, place, and time. She appears well-developed and well-nourished.   HENT:   Head: Normocephalic and atraumatic.   Neck: No thyromegaly present.   Pulmonary/Chest: Effort normal.   Genitourinary: Pelvic exam was performed with patient supine.   Musculoskeletal: Normal range of motion. She exhibits no edema.   Neurological: She is alert and oriented to person, place, and time.   Skin: Skin is warm and dry. No rash noted.   Psychiatric: She " has a normal mood and affect. Her behavior is normal.   Nursing note and vitals reviewed.        Assessment/Plan   Carin was seen today for establish care.    Diagnoses and all orders for this visit:    History of ovarian cyst    Screening for blood or protein in urine  -     Cancel: POC Urinalysis Dipstick    Vaginal atrophy  -     conjugated estrogens (PREMARIN) 0.625 MG/GM vaginal cream; Insert  into the vagina 2 (Two) Times a Week.      Counseling was given to patient for the following topics: self-breast exams , impressions of ultrasound.  Total time of the encounter was 20 minutes and 15 minutes was spend counseling.

## 2018-02-22 ENCOUNTER — OFFICE VISIT (OUTPATIENT)
Dept: RETAIL CLINIC | Facility: CLINIC | Age: 68
End: 2018-02-22

## 2018-02-22 VITALS
OXYGEN SATURATION: 96 % | TEMPERATURE: 96 F | RESPIRATION RATE: 18 BRPM | HEART RATE: 73 BPM | DIASTOLIC BLOOD PRESSURE: 60 MMHG | SYSTOLIC BLOOD PRESSURE: 100 MMHG

## 2018-02-22 DIAGNOSIS — B37.31 VAGINAL YEAST INFECTION: ICD-10-CM

## 2018-02-22 DIAGNOSIS — N30.91 CYSTITIS WITH HEMATURIA: Primary | ICD-10-CM

## 2018-02-22 LAB
BILIRUB BLD-MCNC: ABNORMAL MG/DL
CLARITY, POC: ABNORMAL
COLOR UR: YELLOW
GLUCOSE UR STRIP-MCNC: NEGATIVE MG/DL
KETONES UR QL: NEGATIVE
LEUKOCYTE EST, POC: ABNORMAL
NITRITE UR-MCNC: NEGATIVE MG/ML
PH UR: 6 [PH] (ref 5–8)
PROT UR STRIP-MCNC: ABNORMAL MG/DL
RBC # UR STRIP: ABNORMAL /UL
SP GR UR: 1.02 (ref 1–1.03)
UROBILINOGEN UR QL: NORMAL

## 2018-02-22 PROCEDURE — 99213 OFFICE O/P EST LOW 20 MIN: CPT | Performed by: NURSE PRACTITIONER

## 2018-02-22 PROCEDURE — 81003 URINALYSIS AUTO W/O SCOPE: CPT | Performed by: NURSE PRACTITIONER

## 2018-02-22 RX ORDER — SULFAMETHOXAZOLE AND TRIMETHOPRIM 800; 160 MG/1; MG/1
1 TABLET ORAL 2 TIMES DAILY
Qty: 28 TABLET | Refills: 0 | Status: SHIPPED | OUTPATIENT
Start: 2018-02-22 | End: 2018-03-08

## 2018-02-22 RX ORDER — FLUCONAZOLE 150 MG/1
150 TABLET ORAL ONCE
Qty: 1 TABLET | Refills: 0 | Status: SHIPPED | OUTPATIENT
Start: 2018-02-22 | End: 2018-02-22

## 2018-02-22 RX ORDER — PHENAZOPYRIDINE HYDROCHLORIDE 200 MG/1
200 TABLET, FILM COATED ORAL 3 TIMES DAILY PRN
Qty: 6 TABLET | Refills: 0 | Status: SHIPPED | OUTPATIENT
Start: 2018-02-22 | End: 2018-02-24

## 2018-02-22 NOTE — PROGRESS NOTES
Subjective   Patient ID: Carin Rodriguez is a 67 y.o. female presents with   Chief Complaint   Patient presents with   • Urinary Tract Infection     1 hour ago    • yeast infestion       Urinary Tract Infection    This is a new problem. The current episode started today. The problem occurs every urination. The quality of the pain is described as aching and burning. The pain is severe. There has been no fever. She is not sexually active. There is no history of pyelonephritis. Associated symptoms include frequency, hematuria, nausea and urgency. Pertinent negatives include no chills, flank pain or vomiting. Her past medical history is significant for recurrent UTIs (Jan. 1 had Cipro). There is no history of kidney stones or a single kidney.       Allergies   Allergen Reactions   • Clindamycin/Lincomycin Diarrhea       The following portions of the patient's history were reviewed and updated as appropriate: allergies, current medications, past family history, past medical history, past social history, past surgical history and problem list.      Review of Systems   Constitutional: Positive for fatigue. Negative for chills, diaphoresis and fever.   Gastrointestinal: Positive for nausea. Negative for vomiting.   Genitourinary: Positive for decreased urine volume, dysuria, frequency, hematuria, pelvic pain, urgency and vaginal discharge (thick white). Negative for difficulty urinating, enuresis, flank pain, genital sores, menstrual problem, vaginal bleeding and vaginal pain.        Vaginal itching  Denies STD  Denies fishy odor       Objective     Vitals:    02/22/18 1813   BP: 100/60   Pulse: 73   Resp: 18   Temp: 96 °F (35.6 °C)   SpO2: 96%         Physical Exam   Constitutional: She is oriented to person, place, and time. She appears well-developed and well-nourished. She does not appear ill. No distress.   HENT:   Head: Normocephalic.   Right Ear: Hearing and external ear normal.   Left Ear: Hearing and external ear  normal.   Eyes: Conjunctivae are normal.   Sclera white.   Neck: No tracheal deviation present.   Cardiovascular: Normal rate, regular rhythm, S1 normal, S2 normal and normal heart sounds.    Pulmonary/Chest: Effort normal and breath sounds normal. No accessory muscle usage. No respiratory distress.   Abdominal: Soft. Bowel sounds are normal. There is tenderness in the suprapubic area. There is no CVA tenderness.   Lymphadenopathy:     She has no cervical adenopathy.   Neurological: She is alert and oriented to person, place, and time.   Skin: Skin is warm and dry.   Vitals reviewed.        Carin was seen today for urinary tract infection and yeast infestion.    Diagnoses and all orders for this visit:    Cystitis with hematuria  -     sulfamethoxazole-trimethoprim (BACTRIM DS) 800-160 MG per tablet; Take 1 tablet by mouth 2 (Two) Times a Day for 14 days.  -     phenazopyridine (PYRIDIUM) 200 MG tablet; Take 1 tablet by mouth 3 (Three) Times a Day As Needed for bladder spasms for up to 2 days.  -     POC Urinalysis Dipstick, Automated  -     Urine Culture, Comprehen ([facility]) - Urine, Clean Catch    Vaginal yeast infection  -     fluconazole (DIFLUCAN) 150 MG tablet; Take 1 tablet by mouth 1 (One) Time for 1 dose.        Follow-up with Primary Care Physician in 48-72 hours if these symptoms worsen or fail to improve as anticipated. Patient verbalizes understanding.    Vaginal Yeast infection, Adult  Vaginal yeast infection is a condition that causes soreness, swelling, and redness (inflammation) of the vagina. It also causes vaginal discharge. This is a common condition. Some women get this infection frequently.  What are the causes?  This condition is caused by a change in the normal balance of the yeast (candida) and bacteria that live in the vagina. This change causes an overgrowth of yeast, which causes the inflammation.  What increases the risk?  This condition is more likely to develop in:  · Women who take  antibiotic medicines.  · Women who have diabetes.  · Women who take birth control pills.  · Women who are pregnant.  · Women who douche often.  · Women who have a weak defense (immune) system.  · Women who have been taking steroid medicines for a long time.  · Women who frequently wear tight clothing.  What are the signs or symptoms?  Symptoms of this condition include:  · White, thick vaginal discharge.  · Swelling, itching, redness, and irritation of the vagina. The lips of the vagina (vulva) may be affected as well.  · Pain or a burning feeling while urinating.  · Pain during sex.  How is this diagnosed?  This condition is diagnosed with a medical history and physical exam. This will include a pelvic exam. Your health care provider will examine a sample of your vaginal discharge under a microscope. Your health care provider may send this sample for testing to confirm the diagnosis.  How is this treated?  This condition is treated with medicine. Medicines may be over-the-counter or prescription. You may be told to use one or more of the following:  · Medicine that is taken orally.  · Medicine that is applied as a cream.  · Medicine that is inserted directly into the vagina (suppository).  Follow these instructions at home:  · Take or apply over-the-counter and prescription medicines only as told by your health care provider.  · Do not have sex until your health care provider has approved. Tell your sex partner that you have a yeast infection. That person should go to his or her health care provider if he or she develops symptoms.  · Do not wear tight clothes, such as pantyhose or tight pants.  · Avoid using tampons until your health care provider approves.  · Eat more yogurt. This may help to keep your yeast infection from returning.  · Try taking a sitz bath to help with discomfort. This is a warm water bath that is taken while you are sitting down. The water should only come up to your hips and should cover your  buttocks. Do this 3-4 times per day or as told by your health care provider.  · Do not douche.  · Wear breathable, cotton underwear.  · If you have diabetes, keep your blood sugar levels under control.  Contact a health care provider if:  · You have a fever.  · Your symptoms go away and then return.  · Your symptoms do not get better with treatment.  · Your symptoms get worse.  · You have new symptoms.  · You develop blisters in or around your vagina.  · You have blood coming from your vagina and it is not your menstrual period.  · You develop pain in your abdomen.  This information is not intended to replace advice given to you by your health care provider. Make sure you discuss any questions you have with your health care provider.  Document Released: 09/27/2006 Document Revised: 05/31/2017 Document Reviewed: 06/20/2016  [employer] [facility] Interactive Patient Education © 2017 [employer]  Urinary Tract Infection, Adult  A urinary tract infection (UTI) is an infection of any part of the urinary tract, which includes the kidneys, ureters, bladder, and urethra. These organs make, store, and get rid of urine in the body. UTI can be a bladder infection (cystitis) or kidney infection (pyelonephritis).  What are the causes?  This infection may be caused by fungi, viruses, or bacteria. Bacteria are the most common cause of UTIs. This condition can also be caused by repeated incomplete emptying of the bladder during urination.  What increases the risk?  This condition is more likely to develop if:  · You ignore your need to urinate or hold urine for long periods of time.  · You do not empty your bladder completely during urination.  · You wipe back to front after urinating or having a bowel movement, if you are female.  · You are uncircumcised, if you are male.  · You are constipated.  · You have a urinary catheter that stays in place (indwelling).  · You have a weak defense (immune) system.  · You have a medical condition that  affects your bowels, kidneys, or bladder.  · You have diabetes.  · You take antibiotic medicines frequently or for long periods of time, and the antibiotics no longer work well against certain types of infections (antibiotic resistance).  · You take medicines that irritate your urinary tract.  · You are exposed to chemicals that irritate your urinary tract.  · You are female.  What are the signs or symptoms?  Symptoms of this condition include:  · Fever.  · Frequent urination or passing small amounts of urine frequently.  · Needing to urinate urgently.  · Pain or burning with urination.  · Urine that smells bad or unusual.  · Cloudy urine.  · Pain in the lower abdomen or back.  · Trouble urinating.  · Blood in the urine.  · Vomiting or being less hungry than normal.  · Diarrhea or abdominal pain.  · Vaginal discharge, if you are female.  How is this diagnosed?  This condition is diagnosed with a medical history and physical exam. You will also need to provide a urine sample to test your urine. Other tests may be done, including:  · Blood tests.  · Sexually transmitted disease (STD) testing.  If you have had more than one UTI, a cystoscopy or imaging studies may be done to determine the cause of the infections.  How is this treated?  Treatment for this condition often includes a combination of two or more of the following:  · Antibiotic medicine.  · Other medicines to treat less common causes of UTI.  · Over-the-counter medicines to treat pain.  · Drinking enough water to stay hydrated.  Follow these instructions at home:  · Take over-the-counter and prescription medicines only as told by your health care provider.  · If you were prescribed an antibiotic, take it as told by your health care provider. Do not stop taking the antibiotic even if you start to feel better.  · Avoid alcohol, caffeine, tea, and carbonated beverages. They can irritate your bladder.  · Drink enough fluid to keep your urine clear or pale  yellow.  · Keep all follow-up visits as told by your health care provider. This is important.  · Make sure to:  ¨ Empty your bladder often and completely. Do not hold urine for long periods of time.  ¨ Empty your bladder before and after sex.  ¨ Wipe from front to back after a bowel movement if you are female. Use each tissue one time when you wipe.  Contact a health care provider if:  · You have back pain.  · You have a fever.  · You feel nauseous or vomit.  · Your symptoms do not get better after 3 days.  · Your symptoms go away and then return.  Get help right away if:  · You have severe back pain or lower abdominal pain.  · You are vomiting and cannot keep down any medicines or water.  This information is not intended to replace advice given to you by your health care provider. Make sure you discuss any questions you have with your health care provider.  Document Released: 09/27/2006 Document Revised: 05/31/2017 Document Reviewed: 11/07/2016  [employer] Interactive Patient Education © 2017 [employer]

## 2018-02-22 NOTE — PATIENT INSTRUCTIONS
Vaginal Yeast infection, Adult  Vaginal yeast infection is a condition that causes soreness, swelling, and redness (inflammation) of the vagina. It also causes vaginal discharge. This is a common condition. Some women get this infection frequently.  What are the causes?  This condition is caused by a change in the normal balance of the yeast (candida) and bacteria that live in the vagina. This change causes an overgrowth of yeast, which causes the inflammation.  What increases the risk?  This condition is more likely to develop in:  · Women who take antibiotic medicines.  · Women who have diabetes.  · Women who take birth control pills.  · Women who are pregnant.  · Women who douche often.  · Women who have a weak defense (immune) system.  · Women who have been taking steroid medicines for a long time.  · Women who frequently wear tight clothing.  What are the signs or symptoms?  Symptoms of this condition include:  · White, thick vaginal discharge.  · Swelling, itching, redness, and irritation of the vagina. The lips of the vagina (vulva) may be affected as well.  · Pain or a burning feeling while urinating.  · Pain during sex.  How is this diagnosed?  This condition is diagnosed with a medical history and physical exam. This will include a pelvic exam. Your health care provider will examine a sample of your vaginal discharge under a microscope. Your health care provider may send this sample for testing to confirm the diagnosis.  How is this treated?  This condition is treated with medicine. Medicines may be over-the-counter or prescription. You may be told to use one or more of the following:  · Medicine that is taken orally.  · Medicine that is applied as a cream.  · Medicine that is inserted directly into the vagina (suppository).  Follow these instructions at home:  · Take or apply over-the-counter and prescription medicines only as told by your health care provider.  · Do not have sex until your health care  provider has approved. Tell your sex partner that you have a yeast infection. That person should go to his or her health care provider if he or she develops symptoms.  · Do not wear tight clothes, such as pantyhose or tight pants.  · Avoid using tampons until your health care provider approves.  · Eat more yogurt. This may help to keep your yeast infection from returning.  · Try taking a sitz bath to help with discomfort. This is a warm water bath that is taken while you are sitting down. The water should only come up to your hips and should cover your buttocks. Do this 3-4 times per day or as told by your health care provider.  · Do not douche.  · Wear breathable, cotton underwear.  · If you have diabetes, keep your blood sugar levels under control.  Contact a health care provider if:  · You have a fever.  · Your symptoms go away and then return.  · Your symptoms do not get better with treatment.  · Your symptoms get worse.  · You have new symptoms.  · You develop blisters in or around your vagina.  · You have blood coming from your vagina and it is not your menstrual period.  · You develop pain in your abdomen.  This information is not intended to replace advice given to you by your health care provider. Make sure you discuss any questions you have with your health care provider.  Document Released: 09/27/2006 Document Revised: 05/31/2017 Document Reviewed: 06/20/2016  [employer] Interactive Patient Education © 2017 [employer]  Urinary Tract Infection, Adult  A urinary tract infection (UTI) is an infection of any part of the urinary tract, which includes the kidneys, ureters, bladder, and urethra. These organs make, store, and get rid of urine in the body. UTI can be a bladder infection (cystitis) or kidney infection (pyelonephritis).  What are the causes?  This infection may be caused by fungi, viruses, or bacteria. Bacteria are the most common cause of UTIs. This condition can also be caused by repeated  incomplete emptying of the bladder during urination.  What increases the risk?  This condition is more likely to develop if:  · You ignore your need to urinate or hold urine for long periods of time.  · You do not empty your bladder completely during urination.  · You wipe back to front after urinating or having a bowel movement, if you are female.  · You are uncircumcised, if you are male.  · You are constipated.  · You have a urinary catheter that stays in place (indwelling).  · You have a weak defense (immune) system.  · You have a medical condition that affects your bowels, kidneys, or bladder.  · You have diabetes.  · You take antibiotic medicines frequently or for long periods of time, and the antibiotics no longer work well against certain types of infections (antibiotic resistance).  · You take medicines that irritate your urinary tract.  · You are exposed to chemicals that irritate your urinary tract.  · You are female.  What are the signs or symptoms?  Symptoms of this condition include:  · Fever.  · Frequent urination or passing small amounts of urine frequently.  · Needing to urinate urgently.  · Pain or burning with urination.  · Urine that smells bad or unusual.  · Cloudy urine.  · Pain in the lower abdomen or back.  · Trouble urinating.  · Blood in the urine.  · Vomiting or being less hungry than normal.  · Diarrhea or abdominal pain.  · Vaginal discharge, if you are female.  How is this diagnosed?  This condition is diagnosed with a medical history and physical exam. You will also need to provide a urine sample to test your urine. Other tests may be done, including:  · Blood tests.  · Sexually transmitted disease (STD) testing.  If you have had more than one UTI, a cystoscopy or imaging studies may be done to determine the cause of the infections.  How is this treated?  Treatment for this condition often includes a combination of two or more of the following:  · Antibiotic medicine.  · Other  medicines to treat less common causes of UTI.  · Over-the-counter medicines to treat pain.  · Drinking enough water to stay hydrated.  Follow these instructions at home:  · Take over-the-counter and prescription medicines only as told by your health care provider.  · If you were prescribed an antibiotic, take it as told by your health care provider. Do not stop taking the antibiotic even if you start to feel better.  · Avoid alcohol, caffeine, tea, and carbonated beverages. They can irritate your bladder.  · Drink enough fluid to keep your urine clear or pale yellow.  · Keep all follow-up visits as told by your health care provider. This is important.  · Make sure to:  ¨ Empty your bladder often and completely. Do not hold urine for long periods of time.  ¨ Empty your bladder before and after sex.  ¨ Wipe from front to back after a bowel movement if you are female. Use each tissue one time when you wipe.  Contact a health care provider if:  · You have back pain.  · You have a fever.  · You feel nauseous or vomit.  · Your symptoms do not get better after 3 days.  · Your symptoms go away and then return.  Get help right away if:  · You have severe back pain or lower abdominal pain.  · You are vomiting and cannot keep down any medicines or water.  This information is not intended to replace advice given to you by your health care provider. Make sure you discuss any questions you have with your health care provider.  Document Released: 09/27/2006 Document Revised: 05/31/2017 Document Reviewed: 11/07/2016  [employer] Interactive Patient Education © 2017 [employer]

## 2018-02-23 ENCOUNTER — TELEPHONE (OUTPATIENT)
Dept: OBSTETRICS AND GYNECOLOGY | Age: 68
End: 2018-02-23

## 2018-02-23 PROBLEM — M85.80 OSTEOPENIA: Status: ACTIVE | Noted: 2018-02-23

## 2018-02-23 NOTE — TELEPHONE ENCOUNTER
Spoke with Lucita at [facility], had received fax requesting exact directions for patient's Premarin cream.  Advised per Dr. Carrasquillo's instructions, 0.5 gram vaginally twice weekly.  Dispense #30 gram.

## 2018-02-24 ENCOUNTER — TELEPHONE (OUTPATIENT)
Dept: RETAIL CLINIC | Facility: CLINIC | Age: 68
End: 2018-02-24

## 2018-02-24 LAB
BACTERIA UR CULT: NORMAL
BACTERIA UR CULT: NORMAL

## 2018-02-25 ENCOUNTER — TELEPHONE (OUTPATIENT)
Dept: RETAIL CLINIC | Facility: CLINIC | Age: 68
End: 2018-02-25

## 2018-02-26 ENCOUNTER — TELEPHONE (OUTPATIENT)
Dept: RETAIL CLINIC | Facility: CLINIC | Age: 68
End: 2018-02-26

## 2018-02-26 NOTE — TELEPHONE ENCOUNTER
Advised urine culture essentially neagtive. Patient reports feeling better. F/u with PCP or Gyne as needed.

## 2018-03-01 ENCOUNTER — TELEPHONE (OUTPATIENT)
Dept: OBSTETRICS AND GYNECOLOGY | Age: 68
End: 2018-03-01

## 2018-03-01 NOTE — TELEPHONE ENCOUNTER
Pt aware you are out til tomorrow. Pt is interested in your opinion on using vagifem vs. Premarin. Please advise.    Pt # [phone]

## 2018-03-02 ENCOUNTER — OFFICE VISIT (OUTPATIENT)
Dept: FAMILY MEDICINE CLINIC | Facility: CLINIC | Age: 68
End: 2018-03-02

## 2018-03-02 VITALS
TEMPERATURE: 98.1 F | WEIGHT: 130 LBS | DIASTOLIC BLOOD PRESSURE: 57 MMHG | BODY MASS INDEX: 20.89 KG/M2 | OXYGEN SATURATION: 98 % | HEIGHT: 66 IN | HEART RATE: 69 BPM | SYSTOLIC BLOOD PRESSURE: 90 MMHG

## 2018-03-02 DIAGNOSIS — R11.0 NAUSEA: Primary | ICD-10-CM

## 2018-03-02 PROCEDURE — 99213 OFFICE O/P EST LOW 20 MIN: CPT | Performed by: NURSE PRACTITIONER

## 2018-03-02 NOTE — PROGRESS NOTES
Subjective   Carin Rodriguez is a 67 y.o. female.     History of Present Illness   Patient presents to office with CC of nausea for about 4 weeks. She states it started after her metformin was changed to extended release.  She is currently taking 4 of the 500 mg tablets QAM.  She denies diagnosis of diabetes but states she was started on metformin for impaired fasting glucose by previous PCP.  It was continued after coming here.  She requested to be changed from BID metformin to ER due to diarrhea.  She did not take metformin today and has not had nausea. Denies vomiting or diarrhea.   The following portions of the patient's history were reviewed and updated as appropriate: allergies, current medications, past family history, past medical history, past social history, past surgical history and problem list.    Review of Systems   Constitutional: Negative for chills, fever and unexpected weight change.   Respiratory: Negative for shortness of breath.    Cardiovascular: Negative for chest pain and palpitations.   Gastrointestinal: Positive for nausea. Negative for abdominal distention, abdominal pain, anal bleeding, blood in stool, constipation, diarrhea, rectal pain and vomiting.   Endocrine: Negative for polydipsia, polyphagia and polyuria.   Psychiatric/Behavioral: Negative for behavioral problems.       Objective   Physical Exam   Constitutional: She is oriented to person, place, and time. She appears well-developed and well-nourished.   Cardiovascular: Normal rate and regular rhythm.    Pulmonary/Chest: Effort normal and breath sounds normal.   Neurological: She is alert and oriented to person, place, and time.   Psychiatric: She has a normal mood and affect. Judgment normal.   Nursing note and vitals reviewed.      Assessment/Plan   Carin was seen today for nausea.    Diagnoses and all orders for this visit:    Nausea        Patient will back metformin down to 2 tablets QAM and f/u as scheduled.  She will call  if continued nausea on lower dose

## 2018-03-12 NOTE — TELEPHONE ENCOUNTER
Vagifem and vaginal premarin are both vaginal estrogens that will treat the same symptoms and both good options.

## 2018-03-15 NOTE — TELEPHONE ENCOUNTER
This was a pt -initiated call. We returned her call with no call back. Pt can reach back out again if she has questions/concerns.

## 2018-03-19 ENCOUNTER — TELEPHONE (OUTPATIENT)
Dept: RETAIL CLINIC | Facility: CLINIC | Age: 68
End: 2018-03-19

## 2018-03-19 NOTE — TELEPHONE ENCOUNTER
I spoke with patient about abnormal lab result. Patient was on correct medication and is feeling better.

## 2018-03-26 ENCOUNTER — OFFICE VISIT (OUTPATIENT)
Dept: ORTHOPEDIC SURGERY | Facility: CLINIC | Age: 68
End: 2018-03-26

## 2018-03-26 VITALS — HEIGHT: 65 IN | BODY MASS INDEX: 21.33 KG/M2 | WEIGHT: 128 LBS | TEMPERATURE: 98.6 F

## 2018-03-26 DIAGNOSIS — M25.521 RIGHT ELBOW PAIN: Primary | ICD-10-CM

## 2018-03-26 PROCEDURE — 20605 DRAIN/INJ JOINT/BURSA W/O US: CPT | Performed by: ORTHOPAEDIC SURGERY

## 2018-03-26 PROCEDURE — 99204 OFFICE O/P NEW MOD 45 MIN: CPT | Performed by: ORTHOPAEDIC SURGERY

## 2018-03-26 RX ORDER — METHYLPREDNISOLONE ACETATE 80 MG/ML
80 INJECTION, SUSPENSION INTRA-ARTICULAR; INTRALESIONAL; INTRAMUSCULAR; SOFT TISSUE
Status: COMPLETED | OUTPATIENT
Start: 2018-03-26 | End: 2018-03-26

## 2018-03-26 RX ORDER — LIDOCAINE HYDROCHLORIDE 10 MG/ML
1 INJECTION, SOLUTION INFILTRATION; PERINEURAL
Status: COMPLETED | OUTPATIENT
Start: 2018-03-26 | End: 2018-03-26

## 2018-03-26 RX ORDER — BUPIVACAINE HYDROCHLORIDE 5 MG/ML
1 INJECTION, SOLUTION PERINEURAL
Status: COMPLETED | OUTPATIENT
Start: 2018-03-26 | End: 2018-03-26

## 2018-03-26 RX ADMIN — METHYLPREDNISOLONE ACETATE 80 MG: 80 INJECTION, SUSPENSION INTRA-ARTICULAR; INTRALESIONAL; INTRAMUSCULAR; SOFT TISSUE at 15:15

## 2018-03-26 RX ADMIN — BUPIVACAINE HYDROCHLORIDE 1 ML: 5 INJECTION, SOLUTION PERINEURAL at 15:15

## 2018-03-26 RX ADMIN — LIDOCAINE HYDROCHLORIDE 1 ML: 10 INJECTION, SOLUTION INFILTRATION; PERINEURAL at 15:15

## 2018-03-26 NOTE — PROGRESS NOTES
Carin Rodriguez     : 1950     MRN: [id number]     DATE: 3/26/2018    Chief Complaints:  Right elbow pain, swelling    History of Present Illness:     67 y.o. female patient who presents for evaluation of the right elbow.  The patient reports that the symptoms began approximately 2 months ago.  She does not recall any specific inciting event or factor although she does mention that she first moved to [place] from [place] around this time.  She describes her pain as moderate, intermittent, and aching.  She has not noticed any other associated symptoms.  She has not yet had any treatment.  She has not noticed any alleviating factors other than rest.    Allergies:   Allergies   Allergen Reactions   • Clindamycin/Lincomycin Diarrhea       Medications:   Home Medications:    Current Outpatient Prescriptions:   •  aspirin 81 MG EC tablet, Take 81 mg by mouth Daily., Disp: , Rfl:   •  atorvastatin (LIPITOR) 10 MG tablet, Take 1 tablet by mouth Daily. For cholesterol;(put on file), Disp: 90 tablet, Rfl: 1  •  Bismuth Subsalicylate 262 MG tablet, Take 262 mg by mouth 3 (Three) Times a Day As Needed (for diarrhea)., Disp: 90 each, Rfl: 3  •  cholestyramine (QUESTRAN) 4 GM/DOSE powder, Take 1 packet by mouth Daily. mixed with a liquid, Disp: 378 g, Rfl: 5  •  conjugated estrogens (PREMARIN) 0.625 MG/GM vaginal cream, 0.5 grams per vagina two nights per week, Disp: 30 g, Rfl: 5  •  metFORMIN ER (GLUCOPHAGE-XR) 500 MG 24 hr tablet, Take 4 tablets by mouth Daily With Breakfast., Disp: 120 tablet, Rfl: 5  •  propranolol LA (INDERAL LA) 60 MG 24 hr capsule, Take 1 capsule by mouth Daily. For heart (put on file), Disp: 90 capsule, Rfl: 1  •  RESTASIS 0.05 % ophthalmic emulsion, INSTILL ONE DROP INTO BOTH EYES BID, Disp: , Rfl: 3  •  sertraline (ZOLOFT) 100 MG tablet, Take 1 tablet by mouth Daily. For depression; (put on file), Disp: 90 tablet, Rfl: 1  •  triamcinolone (KENALOG) 0.1 % paste, CHALINO AA 6 TIMES D, Disp: ,  Rfl: 2  •  zolpidem (AMBIEN) 5 MG tablet, TK 1 T PO  QHS, Disp: , Rfl: 5  Past Medical History:   Diagnosis Date   • Depression    • Heart palpitations    • Hyperlipidemia    • Microscopic colitis    • MVP (mitral valve prolapse) 01/24/2018    repeat echo 1-2020     Past Surgical History:   Procedure Laterality Date   • BREAST BIOPSY      several on borh breast benign many years ago   • CATARACT EXTRACTION     • COLONOSCOPY  03/25/2016   • TONSILLECTOMY     • UPPER GASTROINTESTINAL ENDOSCOPY  03/25/2016    normal per patient   • WISDOM TOOTH EXTRACTION       Social History     Occupational History   • retired      Social History Main Topics   • Smoking status: Never Smoker   • Smokeless tobacco: Never Used   • Alcohol use Yes      Comment: social   • Drug use: No   • Sexual activity: Not Currently      Social History     Social History Narrative   • No narrative on file     Family History   Problem Relation Age of Onset   • Diabetes Brother    • Depression Brother    • Prostate cancer Father 60   • Breast cancer Neg Hx    • Ovarian cancer Neg Hx    • Uterine cancer Neg Hx    • Colon cancer Neg Hx    • Melanoma Neg Hx    • Deep vein thrombosis Neg Hx    • Pulmonary embolism Neg Hx        Review of Systems:      Constitutional: Denies fever, shaking or chills   Eyes: Denies change in visual acuity   HEENT: Denies nasal congestion or sore throat   Respiratory: Denies cough or shortness of breath   Cardiovascular: Denies chest pain or edema  Endocrine: Denies tremors, palpitations, intolerance of heat or cold, polyuria, polydipsia.  GI: Denies abdominal pain, nausea, vomiting, bloody stools or diarrhea  : Denies frequency, urgency, incontinence, retention, or nocturia.  Musculoskeletal: Denies numbness tingling or loss of motor function except as above  Integument: Denies rash, lesion or ulceration   Neurologic: Denies headache or focal weakness, deficits  Heme: Denies epistaxis, spontaneous or excessive bleeding,  "epistaxis, hematuria, melena, fatigue, enlarged or tender lymph nodes.      All other pertinent positives and negatives as noted above in HPI.    Physical Exam: 67 y.o. female  Vitals:    03/26/18 1501   Temp: 98.6 °F (37 °C)   TempSrc: Temporal Artery    Weight: 58.1 kg (128 lb)   Height: 165.1 cm (65\")       General:  Patient is awake and alert.  Appears in no acute distress or discomfort.    Psych:  Affect and demeanor are appropriate.    Eyes:  Conjunctiva and sclera appear grossly normal.  Eyes track well and EOM seem to be intact.    Ears:  No gross abnormalities.  Hearing adequate for the exam.    Cardiovascular:  Regular rate and rhythm.    Lungs:  Good chest expansion.  Breathing unlabored.    Extremities:  Right elbow skin appears benign.  No obvious lesions, swellings, masses or adenopathy.  Focal tenderness over lateral epicondyle.  No tenderness over radial tunnel.  Full elbow motion.  No instability.  Good strength with elbow flexion, extension, supination, pronation.  Pain over lateral elbow with resisted wrist extension.  Good strength in hand.  Sensation intact.  Palpable pulses with brisk cap refill.    DIAGNOSTIC STUDIES    Xrays:  AP and lateral views of the right elbow are ordered by myself and reviewed to evaluate the patient's complaint.  No comparison films are immediately available.  The x-rays show no obvious acute abnormalities, lesions, masses, significant degenerative changes, or other concerning findings.    ASSESSMENT: 1.  Right elbow lateral epicondylitis  2.  Diabetes mellitus    PLAN:  We discussed options in detail, both surgical and non-surgical.  I have recommended that we start with a conservative approach.  We discussed all available conservative treatment options including activity modifications, bracing, anti-inflammatories, physical therapy, and injections.  The patient has elected for an injection and a trial of formal physical therapy.  The risks, benefits, and " alternatives to the injection were discussed, particularly with respect to her diabetes.  She consented.  I've given her a referral to formal physical therapy.  Going forward, she will follow-up as needed.    Medium Joint Arthrocentesis  Date/Time: 3/26/2018 3:15 PM  Consent given by: patient  Site marked: site marked  Timeout: Immediately prior to procedure a time out was called to verify the correct patient, procedure, equipment, support staff and site/side marked as required   Supporting Documentation  Indications: pain   Procedure Details  Location: elbow - R elbow (Point of maximal tenderness over lateral epicondyle)  Preparation: Patient was prepped and draped in the usual sterile fashion  Needle size: 25 G  Approach: anterolateral  Medications administered: 1 mL lidocaine 1 %; 80 mg methylPREDNISolone acetate 80 MG/ML; 1 mL bupivacaine 0.5 %  Patient tolerance: patient tolerated the procedure well with no immediate complications          Dusty Mitchell MD    03/26/2018    CC to Callie Carmona PA-C

## 2018-03-27 DIAGNOSIS — R52 PAIN: Primary | ICD-10-CM

## 2018-03-27 RX ORDER — TRAMADOL HYDROCHLORIDE 50 MG/1
TABLET ORAL
Qty: 30 TABLET | Refills: 0 | OUTPATIENT
Start: 2018-03-27 | End: 2018-11-28

## 2018-03-27 NOTE — TELEPHONE ENCOUNTER
Yes.  This is common and will subside.  Tell her to rest and ice.  Mosby, please call in tramadol 50 mg 1-2 by mouth every 4 hours when necessary dispensed #30.  Thanks.

## 2018-04-10 ENCOUNTER — TELEPHONE (OUTPATIENT)
Dept: OBSTETRICS AND GYNECOLOGY | Age: 68
End: 2018-04-10

## 2018-04-10 DIAGNOSIS — N95.2 VAGINAL ATROPHY: Primary | ICD-10-CM

## 2018-04-10 RX ORDER — ESTRADIOL 10 UG/1
1 INSERT VAGINAL 2 TIMES WEEKLY
Qty: 8 TABLET | Refills: 12 | Status: SHIPPED | OUTPATIENT
Start: 2018-04-12 | End: 2018-04-17 | Stop reason: CLARIF

## 2018-04-10 NOTE — TELEPHONE ENCOUNTER
Dr Carrasquillo pt, exp vag dryness was given the option of premarin and the pt is concerned with the side effects of Premarin and wanting to ask about Vagifem instead. Allergic to clindamycin. Please advise

## 2018-04-16 ENCOUNTER — TELEPHONE (OUTPATIENT)
Dept: OBSTETRICS AND GYNECOLOGY | Age: 68
End: 2018-04-16

## 2018-04-16 DIAGNOSIS — N95.2 VAGINAL ATROPHY: Primary | ICD-10-CM

## 2018-04-16 NOTE — TELEPHONE ENCOUNTER
Patient's plan does not cover her Estradiol 10 mcg vaginal tablets.  The only alternatives offered are Premarin cream or Estrace cream.  If either works, please send new rx.

## 2018-04-17 NOTE — TELEPHONE ENCOUNTER
Patient advised of change in medication. Patient had questions about the differences and advised patient to ask pharmacist when she picks up rx.  Verbalized understanding.

## 2018-04-23 ENCOUNTER — TELEPHONE (OUTPATIENT)
Dept: FAMILY MEDICINE CLINIC | Facility: CLINIC | Age: 68
End: 2018-04-23

## 2018-04-23 NOTE — TELEPHONE ENCOUNTER
I had notes to see her and then get   Notes Recorded by Callie Carmona PA-C on 1/29/2018 at 6:04 PM  Labs show goals met;  Watching A1C; I want to see her 3 mos and will order next labs

## 2018-04-24 ENCOUNTER — TELEPHONE (OUTPATIENT)
Dept: OBSTETRICS AND GYNECOLOGY | Age: 68
End: 2018-04-24

## 2018-04-24 NOTE — TELEPHONE ENCOUNTER
Dr Carrasquillo pt is exp some vaginal discomfort and looked with a mirror and seen scabs in the vag area. Please advise

## 2018-04-25 ENCOUNTER — OFFICE VISIT (OUTPATIENT)
Dept: OBSTETRICS AND GYNECOLOGY | Age: 68
End: 2018-04-25

## 2018-04-25 VITALS
SYSTOLIC BLOOD PRESSURE: 100 MMHG | BODY MASS INDEX: 21.83 KG/M2 | WEIGHT: 131 LBS | DIASTOLIC BLOOD PRESSURE: 58 MMHG | HEIGHT: 65 IN

## 2018-04-25 DIAGNOSIS — N89.8 VAGINAL LESION: ICD-10-CM

## 2018-04-25 DIAGNOSIS — R30.0 DYSURIA: ICD-10-CM

## 2018-04-25 DIAGNOSIS — N95.2 ATROPHIC VAGINITIS: Primary | ICD-10-CM

## 2018-04-25 LAB
BILIRUB BLD-MCNC: NEGATIVE MG/DL
CLARITY, POC: CLEAR
COLOR UR: YELLOW
GLUCOSE UR STRIP-MCNC: NEGATIVE MG/DL
KETONES UR QL: NEGATIVE
LEUKOCYTE EST, POC: NEGATIVE
NITRITE UR-MCNC: NEGATIVE MG/ML
PH UR: 7 [PH] (ref 5–8)
PROT UR STRIP-MCNC: NEGATIVE MG/DL
RBC # UR STRIP: NEGATIVE /UL
SP GR UR: 1.01 (ref 1–1.03)
UROBILINOGEN UR QL: NORMAL

## 2018-04-25 PROCEDURE — 99213 OFFICE O/P EST LOW 20 MIN: CPT | Performed by: PHYSICIAN ASSISTANT

## 2018-04-25 PROCEDURE — 81002 URINALYSIS NONAUTO W/O SCOPE: CPT | Performed by: PHYSICIAN ASSISTANT

## 2018-04-25 NOTE — PROGRESS NOTES
"Subjective     Chief Complaint   Patient presents with   • Follow-up     vaginal lesions       Carin Rodriguez is a 67 y.o.  whose LMP is Patient's last menstrual period was 2008. presents with vaginal dryness. She has as h/o this d/t age.  She saw Dr Carrasquillo in February and discussed this with her and was given premarin vaginally but she is \"reticent\" to use this.  She noted bleeding after a BM the other day. She denies STD risk or h/o herpes. She does note that the vaginal dryness has been present since MP(57 yoa) but worse in the past few years. She is opposed to estrogen use as she doesn't want to get cancer. She has no family h/o cancers.  She also notes that she is prone to UTI's.       She is newly established with Dr Carrasquillo, originally from [place].        No Additional Complaints Reported    The following portions of the patient's history were reviewed and updated as appropriate:vital signs, allergies, current medications, past family history, past medical history, past social history, past surgical history and problem list      Review of Systems   A comprehensive review of systems was negative except for: Genitourinary: positive for genital lesions     Objective      /58   Ht 165.1 cm (65\")   Wt 59.4 kg (131 lb)   LMP 2008   BMI 21.80 kg/m²     Physical Exam    General:   alert, comfortable and no distress   Heart: Not performed today   Lungs: Not performed today.   Breast: Not performed today   Neck: na   Abdomen: {Not performed today   CVA: Not performed today   Pelvis: Vaginal: atrophic changes noted externally, laceration noted below introitus at 6 o'clock, no oozing noted and a small amount of bleeding noted after further eval. Culture obtained. +ecchymosis noted around introitus as well. No vaginal exam attempted d/t tiny introitus/stricturing noted   Extremities: Not performed today   Neurologic: negative   Psychiatric: Normal affect, judgement, and mood, anxious       Lab Review "   Labs: Urinalysis - with micro     Imaging   No data reviewed    Assessment/Plan     ASSESSMENT  1. Atrophic vaginitis    2. Dysuria    3. Vaginal lesion        PLAN  1.   Orders Placed This Encounter   Procedures   • Herpes Simplex Virus Culture - Swab, Vagina   • POC Urinalysis Dipstick       2. Medications prescribed this encounter:        New Medications Ordered This Visit   Medications   • Prasterone (INTRAROSA) 6.5 MG insert     Sig: Insert 1 tablet into the vagina Every Night.     Dispense:  30 each     Refill:  3       3. Pt would like to try intrarosa d/t it's lower risk/no external estrogen. I did explain that it does convert to estrogen but it is a negligible amount systemically.  We also discussed that this won't be covered by medicare and offered ways to get it cheaper (pay out of pocket and use card and/or try good rx. Also discussed trying A&d ointment to help heal the laceration and gave sample of [place] liquibeads to try as well.  Advised to avoid hydrocortisone and discussed utilizing dilators to assist with vaginal expansion.  Offered PT as well, she will let us know.   F/u as needed and for annual in October    Follow up: PARAM Terrell  4/25/2018

## 2018-05-02 ENCOUNTER — TELEPHONE (OUTPATIENT)
Dept: OBSTETRICS AND GYNECOLOGY | Age: 68
End: 2018-05-02

## 2018-05-02 LAB
HSV1 DNA SPEC QL NAA+PROBE: NEGATIVE
HSV2 DNA SPEC QL NAA+PROBE: NEGATIVE
Lab: NORMAL

## 2018-05-03 ENCOUNTER — TELEPHONE (OUTPATIENT)
Dept: OBSTETRICS AND GYNECOLOGY | Age: 68
End: 2018-05-03

## 2018-05-03 NOTE — TELEPHONE ENCOUNTER
----- Message from PARAM Franco sent at 5/2/2018  4:43 PM EDT -----  Let her know her swab is neg for herpes

## 2018-05-09 ENCOUNTER — OFFICE VISIT (OUTPATIENT)
Dept: FAMILY MEDICINE CLINIC | Facility: CLINIC | Age: 68
End: 2018-05-09

## 2018-05-09 VITALS
WEIGHT: 131 LBS | HEART RATE: 65 BPM | BODY MASS INDEX: 21.83 KG/M2 | DIASTOLIC BLOOD PRESSURE: 60 MMHG | TEMPERATURE: 98.1 F | SYSTOLIC BLOOD PRESSURE: 110 MMHG | HEIGHT: 65 IN | RESPIRATION RATE: 16 BRPM | OXYGEN SATURATION: 97 %

## 2018-05-09 DIAGNOSIS — R73.01 IMPAIRED FASTING GLUCOSE: Primary | ICD-10-CM

## 2018-05-09 DIAGNOSIS — F33.42 RECURRENT MAJOR DEPRESSIVE DISORDER, IN FULL REMISSION (HCC): ICD-10-CM

## 2018-05-09 DIAGNOSIS — I34.1 MVP (MITRAL VALVE PROLAPSE): ICD-10-CM

## 2018-05-09 DIAGNOSIS — E04.9 THYROID GOITER: ICD-10-CM

## 2018-05-09 DIAGNOSIS — E78.2 MIXED HYPERLIPIDEMIA: ICD-10-CM

## 2018-05-09 PROCEDURE — 99214 OFFICE O/P EST MOD 30 MIN: CPT | Performed by: PHYSICIAN ASSISTANT

## 2018-05-09 PROCEDURE — 90670 PCV13 VACCINE IM: CPT | Performed by: PHYSICIAN ASSISTANT

## 2018-05-09 PROCEDURE — G0009 ADMIN PNEUMOCOCCAL VACCINE: HCPCS | Performed by: PHYSICIAN ASSISTANT

## 2018-05-09 RX ORDER — METFORMIN HYDROCHLORIDE 500 MG/1
TABLET, EXTENDED RELEASE ORAL
Qty: 180 TABLET | Refills: 1 | Status: SHIPPED | OUTPATIENT
Start: 2018-05-09 | End: 2018-11-07 | Stop reason: SDUPTHER

## 2018-05-09 RX ORDER — PROPRANOLOL HCL 60 MG
60 CAPSULE, EXTENDED RELEASE 24HR ORAL DAILY
Qty: 90 CAPSULE | Refills: 1 | Status: SHIPPED | OUTPATIENT
Start: 2018-05-09 | End: 2019-03-23 | Stop reason: SDUPTHER

## 2018-05-09 RX ORDER — ATORVASTATIN CALCIUM 10 MG/1
10 TABLET, FILM COATED ORAL DAILY
Qty: 90 TABLET | Refills: 3 | Status: SHIPPED | OUTPATIENT
Start: 2018-05-09 | End: 2019-03-09

## 2018-05-09 RX ORDER — SERTRALINE HYDROCHLORIDE 100 MG/1
100 TABLET, FILM COATED ORAL DAILY
Qty: 135 TABLET | Refills: 3 | Status: SHIPPED | OUTPATIENT
Start: 2018-05-09 | End: 2019-05-12 | Stop reason: SDUPTHER

## 2018-05-09 NOTE — PATIENT INSTRUCTIONS
Low glycemic index diet  Exercise 30 minutes most days of the week  Make sure you get results on any labs or tests we ordered today  We discussed medications and how to take them as prescribed  Sleep 6-8 hours each night if possible  If you have not signed up for [employer], please activate your code ASAP from your After Visit Summary today    LDL goal <100  LDL goal if heart disease <70  HDL goal >60  Triglyceride goal <150  BP goal =<130/80  Fasting glucose <100

## 2018-05-09 NOTE — PROGRESS NOTES
Subjective   Carin Rodriguez is a 68 y.o. female.     History of Present Illness   Carin Rodriguez 68 y.o. female who presents today for routine follow up check and medication refills.  she has a history of   Patient Active Problem List   Diagnosis   • Impaired fasting glucose   • Depression   • Hyperlipidemia   • Thyroid goiter   • History of ovarian cyst   • Vaginal atrophy   • Osteopenia   • MVP (mitral valve prolapse)   .  Since the last visit, she has overall felt well.  She has Impaired fasting glucose and will continue close lab follow up to watch for DMII, Hyperlipidemia and is well controlled on medication and Vitamin D deficiency and well controlled on medication and labs at goal >30.  she has been compliant with current medications have reviewed them.  The patient denies medication side effects.  I will refill beta blocker for the palpitations and works well  I did B12 and folate  No results found for: LIPASE  Lab Results   Component Value Date    CHLPL 170 01/27/2018    TRIG 142 01/27/2018    HDL 52 01/27/2018    LDL 90 01/27/2018     Lab Results   Component Value Date    BUN 20 01/27/2018    CREATININE 0.69 01/27/2018    EGFRIFNONA 92 01/27/2018    EGFRIFAFRI 106 01/27/2018    BCR 29 (H) 01/27/2018    K 4.8 01/27/2018    CO2 28 01/27/2018    CALCIUM 9.6 01/27/2018    PROTENTOTREF 7.2 01/27/2018    ALBUMIN 4.5 01/27/2018    LABIL2 1.7 01/27/2018    AST 19 01/27/2018    ALT 17 01/27/2018     Lab Results   Component Value Date    HGBA1C 6.1 (H) 01/27/2018       Results for orders placed or performed in visit on 04/25/18   Herpes Simplex Virus (HSV) 1 & 2, PARMJIT   Result Value Ref Range    HSV 1 PARMJIT Negative Negative    HSV 2 PARMJIT Negative Negative   Test Code Change   Result Value Ref Range    Test Code Change Comment    POC Urinalysis Dipstick   Result Value Ref Range    Color Yellow Yellow, Straw, Dark Yellow, Doretha    Clarity, UA Clear Clear    Glucose, UA Negative Negative, 1000 mg/dL (3+) mg/dL     Bilirubin Negative Negative    Ketones, UA Negative Negative    Specific Gravity  1.015 1.005 - 1.030    Blood, UA Negative Negative    pH, Urine 7.0 5.0 - 8.0    Protein, POC Negative Negative mg/dL    Urobilinogen, UA Normal Normal    Leukocytes Negative Negative    Nitrite, UA Negative Negative   she has appt with Dr Amos to check on thyroid goiter    She sees GI and for IBS; on Questran    I did echo in Jan and does have mild MVP and then I met with Dr Guillen about her echo and suggested to repeat in 2 years  I will get labs again in 3mos    The following portions of the patient's history were reviewed and updated as appropriate: allergies, current medications, past family history, past medical history, past social history, past surgical history and problem list.    Review of Systems   Constitutional: Negative for activity change, appetite change and unexpected weight change.   HENT: Negative for nosebleeds and trouble swallowing.    Eyes: Negative for pain and visual disturbance.   Respiratory: Negative for chest tightness, shortness of breath and wheezing.    Cardiovascular: Negative for chest pain and palpitations.   Gastrointestinal: Negative for abdominal pain and blood in stool.   Endocrine: Negative.    Genitourinary: Negative for difficulty urinating and hematuria.   Musculoskeletal: Negative for joint swelling.   Skin: Negative for color change and rash.   Allergic/Immunologic: Negative.    Neurological: Negative for syncope and speech difficulty.   Hematological: Negative for adenopathy.   Psychiatric/Behavioral: Negative for agitation and confusion.   All other systems reviewed and are negative.      Objective   Physical Exam   Constitutional: She is oriented to person, place, and time. She appears well-developed and well-nourished. No distress.   HENT:   Head: Normocephalic and atraumatic.   Right Ear: External ear normal.   Left Ear: External ear normal.   Nose: Nose normal.   Mouth/Throat:  Oropharynx is clear and moist. No oropharyngeal exudate.   Eyes: Conjunctivae and EOM are normal. Pupils are equal, round, and reactive to light. No scleral icterus.   Neck: Normal range of motion. Neck supple. Thyromegaly present.   Cardiovascular: Normal rate, regular rhythm, normal heart sounds and intact distal pulses.    No murmur heard.  Pulmonary/Chest: Effort normal and breath sounds normal. No respiratory distress. She has no wheezes. She has no rales.   Abdominal: Soft.   Musculoskeletal: Normal range of motion. She exhibits no deformity.   Lymphadenopathy:     She has no cervical adenopathy.   Neurological: She is alert and oriented to person, place, and time. Coordination normal.   Skin: Skin is warm and dry.   Psychiatric: She has a normal mood and affect. Her behavior is normal. Judgment and thought content normal.   Nursing note and vitals reviewed.      Assessment/Plan   Carin was seen today for hyperlipidemia.    Diagnoses and all orders for this visit:    Impaired fasting glucose  -     Comprehensive Metabolic Panel; Future  -     Hemoglobin A1c; Future    Mixed hyperlipidemia  -     Comprehensive Metabolic Panel; Future  -     Hemoglobin A1c; Future    Thyroid goiter  -     Comprehensive Metabolic Panel; Future  -     Hemoglobin A1c; Future    Recurrent major depressive disorder, in full remission  -     Comprehensive Metabolic Panel; Future  -     Hemoglobin A1c; Future    MVP (mitral valve prolapse)  -     Comprehensive Metabolic Panel; Future  -     Hemoglobin A1c; Future    Other orders  -     atorvastatin (LIPITOR) 10 MG tablet; Take 1 tablet by mouth Daily. For cholesterol  -     sertraline (ZOLOFT) 100 MG tablet; Take 1 tablet by mouth Daily. 1.5 tabs PO daily for depression  -     metFORMIN ER (GLUCOPHAGE-XR) 500 MG 24 hr tablet; 2 PO daily with food for impaired fasting glucose  -     propranolol LA (INDERAL LA) 60 MG 24 hr capsule; Take 1 capsule by mouth Daily. For heart (put on  file)  -     Pneumococcal Conjugate Vaccine 13-[facility] All

## 2018-05-21 ENCOUNTER — TELEPHONE (OUTPATIENT)
Dept: OBSTETRICS AND GYNECOLOGY | Age: 68
End: 2018-05-21

## 2018-05-21 NOTE — TELEPHONE ENCOUNTER
Dr Carrasquillo or Belen advised pt when she needed more intra adore to call the office for samples, do we have samples?

## 2018-05-30 ENCOUNTER — OFFICE VISIT (OUTPATIENT)
Dept: GASTROENTEROLOGY | Facility: CLINIC | Age: 68
End: 2018-05-30

## 2018-05-30 VITALS
BODY MASS INDEX: 21.89 KG/M2 | WEIGHT: 131.4 LBS | SYSTOLIC BLOOD PRESSURE: 106 MMHG | DIASTOLIC BLOOD PRESSURE: 70 MMHG | HEIGHT: 65 IN | TEMPERATURE: 98.1 F

## 2018-05-30 DIAGNOSIS — K58.0 IRRITABLE BOWEL SYNDROME WITH DIARRHEA: Primary | ICD-10-CM

## 2018-05-30 PROCEDURE — 99212 OFFICE O/P EST SF 10 MIN: CPT | Performed by: INTERNAL MEDICINE

## 2018-05-30 NOTE — PROGRESS NOTES
Chief Complaint   Patient presents with   • Follow-up   • Irritable Bowel Syndrome     Subjective     HPI     Carin Rodriguez is a 68 y.o. female who presents today for f/u.  She has remote hx of microscopic colitis with recent normal colonoscopy. She has underlying IBS-D.  She is doing well.  She tells me she was recently switched to XR formulation of metformin and this has dramatically improved her diarrhea.  She remains on once/day questran.  Weight stable.        Past Medical History:   Diagnosis Date   • Depression    • Heart palpitations    • Hyperlipidemia    • Microscopic colitis    • MVP (mitral valve prolapse) 01/24/2018    repeat echo 1-2020       Social History     Social History   • Marital status:      Spouse name: Rivera   • Number of children: 0   • Years of education: 16     Occupational History   • retired      Social History Main Topics   • Smoking status: Never Smoker   • Smokeless tobacco: Never Used   • Alcohol use Yes      Comment: social   • Drug use: No   • Sexual activity: Not Currently     Other Topics Concern   • Not on file         Current Outpatient Prescriptions:   •  aspirin 81 MG EC tablet, Take 81 mg by mouth Daily., Disp: , Rfl:   •  atorvastatin (LIPITOR) 10 MG tablet, Take 1 tablet by mouth Daily. For cholesterol, Disp: 90 tablet, Rfl: 3  •  Bismuth Subsalicylate 262 MG tablet, Take 262 mg by mouth 3 (Three) Times a Day As Needed (for diarrhea)., Disp: 90 each, Rfl: 3  •  cholestyramine (QUESTRAN) 4 GM/DOSE powder, Take 1 packet by mouth Daily. mixed with a liquid, Disp: 378 g, Rfl: 5  •  conjugated estrogens (PREMARIN) 0.625 MG/GM vaginal cream, Insert  into the vagina Daily. Insert 0.5 g into vagina two nights per week, Disp: 30 g, Rfl: 2  •  metFORMIN ER (GLUCOPHAGE-XR) 500 MG 24 hr tablet, 2 PO daily with food for impaired fasting glucose, Disp: 180 tablet, Rfl: 1  •  Prasterone (INTRAROSA) 6.5 MG insert, Insert 1 tablet into the vagina Every Night., Disp: 30 each,  Rfl: 3  •  propranolol LA (INDERAL LA) 60 MG 24 hr capsule, Take 1 capsule by mouth Daily. For heart (put on file), Disp: 90 capsule, Rfl: 1  •  RESTASIS 0.05 % ophthalmic emulsion, INSTILL ONE DROP INTO BOTH EYES BID, Disp: , Rfl: 3  •  sertraline (ZOLOFT) 100 MG tablet, Take 1 tablet by mouth Daily. 1.5 tabs PO daily for depression, Disp: 135 tablet, Rfl: 3  •  traMADol (ULTRAM) 50 MG tablet, Take 1-2 Tablets by mouth every 4 (four) hours as needed for pain., Disp: 30 tablet, Rfl: 0  •  triamcinolone (KENALOG) 0.1 % paste, CHALINO AA 6 TIMES D, Disp: , Rfl: 2  •  zolpidem (AMBIEN) 5 MG tablet, TK 1 T PO  QHS, Disp: , Rfl: 5    Review of Systems   Constitutional: Negative.    Gastrointestinal: Negative.        Objective   Vitals:    05/30/18 0936   BP: 106/70   Temp: 98.1 °F (36.7 °C)       Physical Exam   Constitutional: She is oriented to person, place, and time. She appears well-developed and well-nourished.   HENT:   Head: Normocephalic and atraumatic.   Neurological: She is alert and oriented to person, place, and time.   Skin: Skin is warm and dry.   Psychiatric: She has a normal mood and affect. Her behavior is normal. Judgment and thought content normal.   Vitals reviewed.      Assessment/Plan   Assessment:     1. Irritable bowel syndrome with diarrhea    2.      Hx of microscopic colitis  3.      Diabetes mellitus    Plan:   Overall improvement in diarrhea - suggestive of some element of medication induced diarrhea from metformin on top of some underlying functional diarrhea.  Continue questran as necessary  Follow up 1 year or sooner if required        Paul Mcmanus M.D.  [facility]  [address] [place] [address]  Office: [phone]

## 2018-06-14 ENCOUNTER — OFFICE VISIT (OUTPATIENT)
Dept: FAMILY MEDICINE CLINIC | Facility: CLINIC | Age: 68
End: 2018-06-14

## 2018-06-14 VITALS
OXYGEN SATURATION: 96 % | BODY MASS INDEX: 22.49 KG/M2 | HEIGHT: 65 IN | HEART RATE: 73 BPM | WEIGHT: 135 LBS | TEMPERATURE: 98.9 F | SYSTOLIC BLOOD PRESSURE: 100 MMHG | DIASTOLIC BLOOD PRESSURE: 58 MMHG | RESPIRATION RATE: 16 BRPM

## 2018-06-14 DIAGNOSIS — G89.29 CHRONIC LEFT SACROILIAC PAIN: Primary | ICD-10-CM

## 2018-06-14 DIAGNOSIS — M53.3 CHRONIC LEFT SACROILIAC PAIN: Primary | ICD-10-CM

## 2018-06-14 PROCEDURE — 99213 OFFICE O/P EST LOW 20 MIN: CPT | Performed by: NURSE PRACTITIONER

## 2018-06-14 NOTE — PROGRESS NOTES
Subjective   Carin Rodriguez is a 68 y.o. female.     History of Present Illness   Carin Rodriguez 68 y.o. female presents for evaluation and treatment of  low back pain. The patient first noted symptoms 2 months ago. It was not related to recent heavy lifting.  The pain intensity is mild and moderate , and is located left side, sacral and sacroiliac. The pain is described as tight (pulling) and occurs intermittently. The symptoms are not progressive. Symptoms are exacerbated by prolonged sitting, standing, and laying. Factors which relieve the pain include NSAID's. Other associated symptoms include aching pain in the left leg. Previous history of symptoms: the problem is intermittent for a few months. Treatment efforts have included NSAID's , with and without relief.    The following portions of the patient's history were reviewed and updated as appropriate: allergies, current medications, past family history, past medical history, past social history, past surgical history and problem list.    Review of Systems   Musculoskeletal: Positive for back pain. Negative for gait problem.   Neurological: Negative for weakness and numbness.       Objective   Physical Exam   Constitutional: She is oriented to person, place, and time. She appears well-developed and well-nourished.   Pulmonary/Chest: Effort normal.   Musculoskeletal:        Lumbar back: She exhibits tenderness and pain. She exhibits normal range of motion.        Back:    Neurological: She is alert and oriented to person, place, and time.   Psychiatric: She has a normal mood and affect. Judgment normal.   Nursing note and vitals reviewed.      Assessment/Plan   Carin was seen today for sciatica.    Diagnoses and all orders for this visit:    Chronic left sacroiliac pain  -     Ambulatory Referral to Physical Therapy Evaluate and treat

## 2018-06-15 ENCOUNTER — TELEPHONE (OUTPATIENT)
Dept: OBSTETRICS AND GYNECOLOGY | Age: 68
End: 2018-06-15

## 2018-06-15 NOTE — TELEPHONE ENCOUNTER
"It's used to help decrease vaginal irritation and dryness.  The intention if for it to be used long term.  If she wants to try a \"holiday\" d/t cost, she can do that but vaginal irritation may return  "

## 2018-06-15 NOTE — TELEPHONE ENCOUNTER
Dr Carrasquillo pt, wants to know how long she thinks she will have to use the Intrarosa. Please advise

## 2018-06-22 ENCOUNTER — TREATMENT (OUTPATIENT)
Dept: PHYSICAL THERAPY | Facility: CLINIC | Age: 68
End: 2018-06-22

## 2018-06-22 DIAGNOSIS — G89.29 CHRONIC LEFT SACROILIAC PAIN: Primary | ICD-10-CM

## 2018-06-22 DIAGNOSIS — M53.3 CHRONIC LEFT SACROILIAC PAIN: Primary | ICD-10-CM

## 2018-06-22 DIAGNOSIS — S39.012D LUMBOSACRAL STRAIN, SUBSEQUENT ENCOUNTER: ICD-10-CM

## 2018-06-22 PROCEDURE — G8978 MOBILITY CURRENT STATUS: HCPCS | Performed by: PHYSICAL THERAPIST

## 2018-06-22 PROCEDURE — 97162 PT EVAL MOD COMPLEX 30 MIN: CPT | Performed by: PHYSICAL THERAPIST

## 2018-06-22 PROCEDURE — G8979 MOBILITY GOAL STATUS: HCPCS | Performed by: PHYSICAL THERAPIST

## 2018-06-22 PROCEDURE — 97110 THERAPEUTIC EXERCISES: CPT | Performed by: PHYSICAL THERAPIST

## 2018-06-22 NOTE — PROGRESS NOTES
Physical Therapy Initial Evaluation and Plan of Care    Patient: Carin Rodriguez   : 1950  Diagnosis/ICD-10 Code:  Chronic left sacroiliac pain [M53.3, G89.29]  Referring practitioner: Lacy العلي,*    Subjective Evaluation    History of Present Illness  Mechanism of injury: Pt reports 52 yrs with insidious onset.  Pt reports that her father had back pain too.  Pt reports that she had PT with success, with relieving of pain.  Pt reports that she has had accupunture & accupressure in the past with good results.      CC:  Pain is present on the (L) side of the hip into the posterior thigh.  And every couple days there is pain on the (R) side.       Occupation:  Part-time - [employer] 18 hrs.   Activities:  Errands, have a gym membership but not gone, house & yard work  PLOF: Independent  Medical Hx Reviewed.      Quality of life: excellent    Pain  Current pain rating: 3  At best pain ratin  At worst pain ratin  Location: (L) Hip into posterior thigh  Quality: burning and pulling  Relieving factors: medications, ice, heat, change in position and rest  Aggravating factors: squatting (Laying on (L) side, sitting, standing > 1 hr, rolling over)  Progression: no change    Social Support  Lives in: multiple-level home  Lives with: spouse (2 dogs)    Hand dominance: right    Diagnostic Tests  No diagnostic tests performed             Objective       Active Range of Motion     Lumbar   Flexion: 100 (%) degrees with pain  Extension: 25 (%) degrees   Left lateral flexion: 75 (%) degrees   Right lateral flexion: 25 (%) degrees   Left rotation: 50 (%) degrees with pain  Right rotation: 50 (%) degrees     Additional Active Range of Motion Details  Flat back syndrome in flexion    Strength/Myotome Testing     Left Hip   Planes of Motion   Flexion: 4+  Abduction: 4  External rotation: 4  Internal rotation: 4    Right Hip   Planes of Motion   Flexion: 4  Abduction: 4+  External rotation: 4  Internal  rotation: 4+    Left Knee   Flexion: 5  Extension: 5    Right Knee   Flexion: 5  Extension: 5    Left Ankle/Foot   Dorsiflexion: 5  Eversion: 5  Great toe extension: 5    Right Ankle/Foot   Dorsiflexion: 5  Eversion: 5  Great toe extension: 5         Assessment & Plan     Assessment  Impairments: abnormal or restricted ROM, activity intolerance, impaired physical strength, lacks appropriate home exercise program, pain with function and weight-bearing intolerance  Assessment details: Pt presents to PT with symptoms consistent with a lumbosacral strain and SI radicular symptoms.  Pt would benefit from skilled PT intervention to address the deficits noted.     Prognosis: good  Functional Limitations: carrying objects, lifting, sleeping, walking, uncomfortable because of pain, moving in bed, sitting, standing and unable to perform repetitive tasks  Goals  Plan Goals: SHORT TERM GOALS: Time for Goal Achievement: 4 weeks    1.  Patient to be compliant and progression of HEP                             2.  Pain level < 4/10 at worst with mentioned activities to improve function  3.  Increased thoracic, lumbar and SIJ mobility to allow for increased lumbar AROM with less pain.  4.  Increased lumbar AROM to by 25% in all planes to allow for increased ease with sit-stand transfers and functional activities    LONG TERM GOALS: Time for Goal Achievement: 2 months  1.  Pt. to score < 15% on Back Index  2.  Pain level < 3/10 with all listed activities to return to normal.  3.  Lumbar AROM to WFL to allow for return to household & recreational activities w/o increased symptoms  4.  (B) LE and lower abdominal strength to 5/5 to allow for pushing, pulling and activities to occur without pain (driving, sitting, household  & Job requirements)        Plan  Therapy options: will be seen for skilled physical therapy services  Planned modality interventions: cryotherapy, electrical stimulation/Russian stimulation, TENS and thermotherapy  (hydrocollator packs)  Other planned modality interventions: Dry Needling  Planned therapy interventions: body mechanics training, flexibility, functional ROM exercises, home exercise program, manual therapy, neuromuscular re-education, postural training, spinal/joint mobilization, stretching, strengthening and soft tissue mobilization  Frequency: 2x week  Duration in visits: 16  Treatment plan discussed with: patient        Manual Therapy:         mins  17214;  Therapeutic Exercise:    15     mins  35189;     Neuromuscular Mynor:        mins  73484;    Therapeutic Activity:          mins  23117;     Gait Training:           mins  04992;     Ultrasound:          mins  81712;    Electrical Stimulation:         mins  18148 ( );  Dry Needling          mins self-pay    Timed Treatment:   15   mins   Total Treatment:     60   mins    PT SIGNATURE: Oscar Luz PT   [place] Lic #[id number]    DATE TREATMENT INITIATED: 6/22/2018    Medicare Initial Certification  Certification Period: 9/20/2018  I certify that the therapy services are furnished while this patient is under my care.  The services outlined above are required by this patient, and will be reviewed every 90 days.     PHYSICIAN: Lacy العلي, ESTEFANIA      DATE:     Please sign and return via fax to [phone].. Thank you, [facility].

## 2018-07-06 ENCOUNTER — TREATMENT (OUTPATIENT)
Dept: PHYSICAL THERAPY | Facility: CLINIC | Age: 68
End: 2018-07-06

## 2018-07-06 DIAGNOSIS — G89.29 CHRONIC LEFT SACROILIAC PAIN: Primary | ICD-10-CM

## 2018-07-06 DIAGNOSIS — S39.012D LUMBOSACRAL STRAIN, SUBSEQUENT ENCOUNTER: ICD-10-CM

## 2018-07-06 DIAGNOSIS — M53.3 CHRONIC LEFT SACROILIAC PAIN: Primary | ICD-10-CM

## 2018-07-06 PROCEDURE — 97535 SELF CARE MNGMENT TRAINING: CPT | Performed by: PHYSICAL THERAPIST

## 2018-07-06 PROCEDURE — 97110 THERAPEUTIC EXERCISES: CPT | Performed by: PHYSICAL THERAPIST

## 2018-07-06 NOTE — PROGRESS NOTES
Physical Therapy Daily Progress Note  Visit: 2    Carin Rodriguez reports: I have been hurting since my last visit.  The exercise make me hurt, but I kept trying to do them.  I also brought my TENS unit in today to see if you could help me use it.      Subjective     Objective   See Exercise, Manual, and Modality Logs for complete treatment.     Reviewed the application and use of the TENS unit including safety.     Assessment & Plan     Assessment  Assessment details: The pt is not keri laying supine well because of the SI pressure.  The pt did have some relief of pain with the use of the TENS unit.  Pt demo (I) with the use of the TENS unit for home use.     Plan  Plan details: Progress ROM / strengthening / stabilization / functional activity as tolerated                   Manual Therapy:         mins  65068;  Therapeutic Exercise:    15     mins  79406;     Neuromuscular Mynor:        mins  30912;    Therapeutic Activity:          mins  98620;     Gait Training:           mins  97347;     Ultrasound:          mins  53723;    Electrical Stimulation:         mins  51849 ( );  Dry Needling          mins self-pay  Self Care/Home Management    15       Timed Treatment:   30   mins   Total Treatment:     45   mins    Oscar Luz PT  [place] Lic. # [id number]  Physical Therapist

## 2018-07-07 ENCOUNTER — OFFICE VISIT (OUTPATIENT)
Dept: RETAIL CLINIC | Facility: CLINIC | Age: 68
End: 2018-07-07

## 2018-07-07 VITALS
OXYGEN SATURATION: 97 % | RESPIRATION RATE: 16 BRPM | TEMPERATURE: 99.8 F | SYSTOLIC BLOOD PRESSURE: 96 MMHG | DIASTOLIC BLOOD PRESSURE: 60 MMHG | HEART RATE: 68 BPM

## 2018-07-07 DIAGNOSIS — N30.01 ACUTE CYSTITIS WITH HEMATURIA: Primary | ICD-10-CM

## 2018-07-07 LAB
BILIRUB BLD-MCNC: NEGATIVE MG/DL
CLARITY, POC: ABNORMAL
COLOR UR: YELLOW
GLUCOSE UR STRIP-MCNC: NEGATIVE MG/DL
KETONES UR QL: NEGATIVE
LEUKOCYTE EST, POC: ABNORMAL
NITRITE UR-MCNC: NEGATIVE MG/ML
PH UR: 5.5 [PH] (ref 5–8)
PROT UR STRIP-MCNC: NEGATIVE MG/DL
RBC # UR STRIP: ABNORMAL /UL
SP GR UR: 1.03 (ref 1–1.03)
UROBILINOGEN UR QL: NORMAL

## 2018-07-07 PROCEDURE — 81002 URINALYSIS NONAUTO W/O SCOPE: CPT | Performed by: NURSE PRACTITIONER

## 2018-07-07 PROCEDURE — 99213 OFFICE O/P EST LOW 20 MIN: CPT | Performed by: NURSE PRACTITIONER

## 2018-07-07 RX ORDER — PHENAZOPYRIDINE HYDROCHLORIDE 200 MG/1
200 TABLET, FILM COATED ORAL 3 TIMES DAILY PRN
Qty: 6 TABLET | Refills: 0 | Status: SHIPPED | OUTPATIENT
Start: 2018-07-07 | End: 2018-11-07

## 2018-07-07 RX ORDER — CIPROFLOXACIN 500 MG/1
500 TABLET, FILM COATED ORAL 2 TIMES DAILY
Qty: 20 TABLET | Refills: 0 | Status: SHIPPED | OUTPATIENT
Start: 2018-07-07 | End: 2018-07-07 | Stop reason: SDUPTHER

## 2018-07-07 RX ORDER — CIPROFLOXACIN 500 MG/1
500 TABLET, FILM COATED ORAL 2 TIMES DAILY
Qty: 20 TABLET | Refills: 0 | Status: SHIPPED | OUTPATIENT
Start: 2018-07-07 | End: 2018-07-17

## 2018-07-07 NOTE — PROGRESS NOTES
[facility]    CC:   Chief Complaint   Patient presents with   • Urinary Tract Infection     HPI   68 YOF presents c/o 1 day of urinary burning/urgency/frequency/can't go much/possibly blood tinged urine/painful urination.  She notes a past h/o frequent UTIs that improved on estrogen based cream and with ky beads started by her urogynecologist.   She denies f/c/flank pain/suprapubic pain/n/v/d/abdominal pain or other new concerns.  She has not taken any medications for this.  She notes has been on cipro in past with resolution.     Review of Systems: Please see the above history of present illness for pertinent positives and negatives. The remainder of the patient's systems have been reviewed and are negative.     Past Medical History:   Diagnosis Date   • Allergic    • Depression    • Heart palpitations    • Hyperlipidemia    • Microscopic colitis    • MVP (mitral valve prolapse) 01/24/2018    repeat echo 1-2020   • Urinary tract infection        Past Surgical History:   Procedure Laterality Date   • BREAST BIOPSY      several on borh breast benign many years ago   • CATARACT EXTRACTION     • COLONOSCOPY  03/25/2016   • TONSILLECTOMY     • UPPER GASTROINTESTINAL ENDOSCOPY  03/25/2016    normal per patient   • WISDOM TOOTH EXTRACTION         Social History   Substance Use Topics   • Smoking status: Never Smoker   • Smokeless tobacco: Never Used   • Alcohol use Yes      Comment: social     Current Outpatient Prescriptions:   •  aspirin 81 MG EC tablet, Take 81 mg by mouth Daily., Disp: , Rfl:   •  atorvastatin (LIPITOR) 10 MG tablet, Take 1 tablet by mouth Daily. For cholesterol, Disp: 90 tablet, Rfl: 3  •  Bismuth Subsalicylate 262 MG tablet, Take 262 mg by mouth 3 (Three) Times a Day As Needed (for diarrhea)., Disp: 90 each, Rfl: 3  •  cholestyramine (QUESTRAN) 4 GM/DOSE powder, Take 1 packet by mouth Daily. mixed with a liquid, Disp: 378 g, Rfl: 5  •  metFORMIN ER (GLUCOPHAGE-XR) 500 MG 24 hr tablet, 2 PO  daily with food for impaired fasting glucose, Disp: 180 tablet, Rfl: 1  •  Prasterone (INTRAROSA) 6.5 MG insert, Insert 1 tablet into the vagina Every Night., Disp: 30 each, Rfl: 3  •  propranolol LA (INDERAL LA) 60 MG 24 hr capsule, Take 1 capsule by mouth Daily. For heart (put on file), Disp: 90 capsule, Rfl: 1  •  RESTASIS 0.05 % ophthalmic emulsion, INSTILL ONE DROP INTO BOTH EYES BID, Disp: , Rfl: 3  •  sertraline (ZOLOFT) 100 MG tablet, Take 1 tablet by mouth Daily. 1.5 tabs PO daily for depression, Disp: 135 tablet, Rfl: 3  •  traMADol (ULTRAM) 50 MG tablet, Take 1-2 Tablets by mouth every 4 (four) hours as needed for pain., Disp: 30 tablet, Rfl: 0  •  zolpidem (AMBIEN) 5 MG tablet, TK 1 T PO  QHS, Disp: , Rfl: 5    Allergies   Allergen Reactions   • Clindamycin/Lincomycin Diarrhea     OBJECTIVE:    BP 96/60   Pulse 68   Temp 99.8 °F (37.7 °C) (Oral)   Resp 16   LMP 02/22/2008   SpO2 97%     Lab Results (last 24 hours)     Procedure Component Value Units Date/Time    POC Urinalysis Dipstick, Automated [[id number]]  (Abnormal) Collected:  07/07/18 1639    Specimen:  Urine Updated:  07/07/18 1640     Color Yellow     Clarity, UA Hazy (A)     Specific Gravity  1.030     pH, Urine 5.5     Leukocytes Moderate (2+) (A)     Nitrite, UA Negative     Protein, POC Negative mg/dL      Glucose, UA Negative mg/dL      Ketones, UA Negative     Urobilinogen, UA Normal     Bilirubin Negative     Blood, UA 3+ (A)   Unable to send culture, not enough urine produced.        General Appearance:    Alert, cooperative, no distress, appears stated age   Head:    Normocephalic, without obvious abnormality, atraumatic   Eyes:    PERRL, conjunctiva/corneas clear, EOM's intact, fundi     benign, both eyes   Ears:    Normal TM's and external ear canals, both ears   Nose:   Nares normal, septum midline, mucosa normal, no drainage     or sinus tenderness   Throat:   Lips, mucosa, and tongue normal; teeth and gums normal  Tonsils  without erythema or exudates.   Neck:   Supple, symmetrical, trachea midline, no adenopathy;        Back:     Symmetric, no curvature, ROM normal, no CVA tenderness   Lungs:     Clear to auscultation bilaterally, respirations unlabored   Chest Wall:    No tenderness or deformity    Heart:    Regular rate and rhythm, S1 and S2 normal, no murmur, rub    or gallop       Abdomen:     Soft, non-tender, bowel sounds active all four quadrants,     no masses, no organomegaly                                                     ASSESSMENT/PLAN    1. Acute cystitis with hematuria  Instructed to f/u urogyn provider for recheck after completion of antibiotic to ensure resolution    - ciprofloxacin (CIPRO) 500 MG tablet; Take 1 tablet by mouth 2 (Two) Times a Day for 10 days.  Dispense: 20 tablet; Refill: 0  - phenazopyridine (PYRIDIUM) 200 MG tablet; Take 1 tablet by mouth 3 (Three) Times a Day As Needed for bladder spasms.  Dispense: 6 tablet; Refill: 0      Ms. Jennifer had no medications administered during this visit.

## 2018-07-09 ENCOUNTER — TREATMENT (OUTPATIENT)
Dept: PHYSICAL THERAPY | Facility: CLINIC | Age: 68
End: 2018-07-09

## 2018-07-09 DIAGNOSIS — M53.3 CHRONIC LEFT SACROILIAC PAIN: Primary | ICD-10-CM

## 2018-07-09 DIAGNOSIS — S39.012D LUMBOSACRAL STRAIN, SUBSEQUENT ENCOUNTER: ICD-10-CM

## 2018-07-09 DIAGNOSIS — G89.29 CHRONIC LEFT SACROILIAC PAIN: Primary | ICD-10-CM

## 2018-07-09 PROCEDURE — 97535 SELF CARE MNGMENT TRAINING: CPT | Performed by: PHYSICAL THERAPIST

## 2018-07-09 PROCEDURE — 97110 THERAPEUTIC EXERCISES: CPT | Performed by: PHYSICAL THERAPIST

## 2018-07-09 PROCEDURE — DRYNDL PR CUSTOM DRY NEEDLING SELF PAY: Performed by: PHYSICAL THERAPIST

## 2018-07-09 NOTE — PROGRESS NOTES
Physical Therapy Daily Progress Note  Visit: 3    Carin Rodriguez reports: My back is really hurting from working this weekend.  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  I got a new TENS unit that is better then my old machine, can you show me how to use it.      Subjective     Objective   See Exercise, Manual, and Modality Logs for complete treatment.     Reviewed the use of the TENS unit for home use.      After reviewing all risk of dry needling (including pneumothorax, bruising, infection, nerve injury, and soreness), written informed consent was obtained.  Manual palpation and assessment performed before, during and after dry needling session.  Clean needle technique observed at all times, precautions for lung fields, neurovascular structures observed.         Assessment & Plan     Assessment  Assessment details: Pt keri Rx well with the intro of DN to the lumbosacral region.  The combination of the DN, TENS Unit, MHP and light TE, seems to be helping reduce some of the pt's pain.  A letter was written and given to the pt to give to her employer, to take the week off because of the back pain flare.      Plan  Plan details: Progress ROM / strengthening /stabilization / functional activity as tolerated                   Manual Therapy:         mins  32506;  Therapeutic Exercise:    15     mins  54016;     Neuromuscular Mynor:        mins  34091;    Therapeutic Activity:          mins  26045;     Gait Training:           mins  93666;     Ultrasound:          mins  72168;    Electrical Stimulation:         mins  43487 ( );  Dry Needling     15     mins self-pay  Self-care / Home Management   10   mins    Timed Treatment:   40   mins   Total Treatment:     55   mins    Oscar Luz PT  [facility]. # [id number]  Physical Therapist

## 2018-07-12 ENCOUNTER — TREATMENT (OUTPATIENT)
Dept: PHYSICAL THERAPY | Facility: CLINIC | Age: 68
End: 2018-07-12

## 2018-07-12 DIAGNOSIS — S39.012D LUMBOSACRAL STRAIN, SUBSEQUENT ENCOUNTER: ICD-10-CM

## 2018-07-12 DIAGNOSIS — M53.3 CHRONIC LEFT SACROILIAC PAIN: Primary | ICD-10-CM

## 2018-07-12 DIAGNOSIS — G89.29 CHRONIC LEFT SACROILIAC PAIN: Primary | ICD-10-CM

## 2018-07-12 PROCEDURE — 97110 THERAPEUTIC EXERCISES: CPT | Performed by: PHYSICAL THERAPIST

## 2018-07-12 PROCEDURE — DRYNDL PR CUSTOM DRY NEEDLING SELF PAY: Performed by: PHYSICAL THERAPIST

## 2018-07-12 NOTE — PROGRESS NOTES
Physical Therapy Daily Progress Note  Visit: 4    Carin Rodriguez reports: I am feeling much better since my last visit.  The pain is much less in my back, I think that Dry Needling really helped.    Subjective     Objective   See Exercise, Manual, and Modality Logs for complete treatment.       Assessment & Plan     Assessment  Assessment details: The pt has responded well to Dry Needling with decreased pain in the lumbosacral region.  The pt was more sensitive to the setting of each needle today in the pt's lumbosacral region, but the pt was fine with continued with asked if she needed to stop.   Began endurance training today to help stamina in WB.      Plan  Plan details: Progress ROM / strengthening /stabilization / functional activity as tolerated                   Manual Therapy:         mins  35207;  Therapeutic Exercise:    25     mins  40132;     Neuromuscular Mynor:        mins  46913;    Therapeutic Activity:          mins  82708;     Gait Training:           mins  92820;     Ultrasound:          mins  37039;    Electrical Stimulation:         mins  02084 ( );  Dry Needling     15     mins self-pay    Timed Treatment:   40   mins   Total Treatment:     55   mins    Oscar Luz PT  [place] Lic. # [id number]  Physical Therapist

## 2018-07-16 ENCOUNTER — TREATMENT (OUTPATIENT)
Dept: PHYSICAL THERAPY | Facility: CLINIC | Age: 68
End: 2018-07-16

## 2018-07-16 DIAGNOSIS — S39.012D LUMBOSACRAL STRAIN, SUBSEQUENT ENCOUNTER: ICD-10-CM

## 2018-07-16 DIAGNOSIS — M53.3 CHRONIC LEFT SACROILIAC PAIN: Primary | ICD-10-CM

## 2018-07-16 DIAGNOSIS — G89.29 CHRONIC LEFT SACROILIAC PAIN: Primary | ICD-10-CM

## 2018-07-16 PROCEDURE — 97110 THERAPEUTIC EXERCISES: CPT | Performed by: PHYSICAL THERAPIST

## 2018-07-16 PROCEDURE — 97140 MANUAL THERAPY 1/> REGIONS: CPT | Performed by: PHYSICAL THERAPIST

## 2018-07-16 NOTE — PROGRESS NOTES
Physical Therapy Daily Progress Note  Visit: 5    Carin Rodriguez reports: I continue to have pain in my back.  It is waking me up in the middle of the night because it is hurting    Subjective     Objective   See Exercise, Manual, and Modality Logs for complete treatment.       Assessment & Plan     Assessment  Assessment details: Pt's symptoms are concerning that the pain is unable to be palpated and the pain is waking her up at night from sleep.  Will monitor for one visit to see how the pt keri the MT with any improvement.  If the pt continues to have non-reproduce able  Pain the pt will be referred to back to her MD.      Plan  Plan details: Monitor the pt's progress.                   Manual Therapy:    10     mins  15952;  Therapeutic Exercise:    25     mins  73553;     Neuromuscular Mynor:        mins  93780;    Therapeutic Activity:          mins  06619;     Gait Training:           mins  80649;     Ultrasound:          mins  58729;    Electrical Stimulation:         mins  14059 ( );  Dry Needling          mins self-pay    Timed Treatment:   35   mins   Total Treatment:     35   mins    Oscar Luz PT  [place] Lic. # [id number]  Physical Therapist

## 2018-07-20 ENCOUNTER — TREATMENT (OUTPATIENT)
Dept: PHYSICAL THERAPY | Facility: CLINIC | Age: 68
End: 2018-07-20

## 2018-07-20 DIAGNOSIS — S39.012D LUMBOSACRAL STRAIN, SUBSEQUENT ENCOUNTER: ICD-10-CM

## 2018-07-20 DIAGNOSIS — G89.29 CHRONIC LEFT SACROILIAC PAIN: Primary | ICD-10-CM

## 2018-07-20 DIAGNOSIS — M53.3 CHRONIC LEFT SACROILIAC PAIN: Primary | ICD-10-CM

## 2018-07-20 PROCEDURE — 97140 MANUAL THERAPY 1/> REGIONS: CPT | Performed by: PHYSICAL THERAPIST

## 2018-07-20 PROCEDURE — 97110 THERAPEUTIC EXERCISES: CPT | Performed by: PHYSICAL THERAPIST

## 2018-07-20 NOTE — PROGRESS NOTES
Physical Therapy Daily Progress Note  Visit: 6    Carin Rodriguez reports: My back has been feeling better since my last visit.  I am still waking with the pain, but I am not sure if it is because I have pain or I need to use the restroom and the pain is there.  I work 2 days this week and was able to tolerate it much better.      Subjective     Objective   See Exercise, Manual, and Modality Logs for complete treatment.       Assessment & Plan     Assessment  Assessment details: Will continue monitor how the pt tols Rx to her back with MT.  The pt has been instructed to monitor her symptoms.      Plan  Plan details: Progress ROM / strengthening /stabilization / functional activity as tolerated                   Manual Therapy:    10     mins  47437;  Therapeutic Exercise:    30     mins  16815;     Neuromuscular Mynor:        mins  30740;    Therapeutic Activity:          mins  42431;     Gait Training:           mins  83638;     Ultrasound:          mins  24221;    Electrical Stimulation:         mins  42800 ( );  Dry Needling          mins self-pay    Timed Treatment:   40   mins   Total Treatment:     55   mins    Oscar Luz PT  [place] Lic. # [id number]  Physical Therapist

## 2018-07-23 ENCOUNTER — TREATMENT (OUTPATIENT)
Dept: PHYSICAL THERAPY | Facility: CLINIC | Age: 68
End: 2018-07-23

## 2018-07-23 DIAGNOSIS — M53.3 CHRONIC LEFT SACROILIAC PAIN: Primary | ICD-10-CM

## 2018-07-23 DIAGNOSIS — G89.29 CHRONIC LEFT SACROILIAC PAIN: Primary | ICD-10-CM

## 2018-07-23 DIAGNOSIS — S39.012D LUMBOSACRAL STRAIN, SUBSEQUENT ENCOUNTER: ICD-10-CM

## 2018-07-23 PROCEDURE — 97110 THERAPEUTIC EXERCISES: CPT | Performed by: PHYSICAL THERAPIST

## 2018-07-23 PROCEDURE — 97140 MANUAL THERAPY 1/> REGIONS: CPT | Performed by: PHYSICAL THERAPIST

## 2018-07-23 NOTE — PROGRESS NOTES
Physical Therapy Daily Progress Note  Visit: 7    Carin Rodriguez reports: I am feeling better.  I am scared to say that I am better.      Subjective     Objective   See Exercise, Manual, and Modality Logs for complete treatment.       Assessment & Plan     Assessment  Assessment details: Pt became sore during Rx with MT in the SI region.  Overall the pt keri Rx well while in the clinic.      Plan  Plan details: Progress ROM / strengthening /stabilization / functional activity as tolerated                   Manual Therapy:    13     mins  17338;  Therapeutic Exercise:    30     mins  41119;     Neuromuscular Mynor:        mins  09229;    Therapeutic Activity:          mins  53700;     Gait Training:           mins  68955;     Ultrasound:          mins  38361;    Electrical Stimulation:         mins  41184 ( );  Dry Needling          mins self-pay    Timed Treatment:   43   mins   Total Treatment:     58   mins    Oscar Luz PT  [place] Lic. # [id number]  Physical Therapist

## 2018-07-25 ENCOUNTER — TREATMENT (OUTPATIENT)
Dept: PHYSICAL THERAPY | Facility: CLINIC | Age: 68
End: 2018-07-25

## 2018-07-25 DIAGNOSIS — G89.29 CHRONIC LEFT SACROILIAC PAIN: Primary | ICD-10-CM

## 2018-07-25 DIAGNOSIS — M53.3 CHRONIC LEFT SACROILIAC PAIN: Primary | ICD-10-CM

## 2018-07-25 DIAGNOSIS — S39.012D LUMBOSACRAL STRAIN, SUBSEQUENT ENCOUNTER: ICD-10-CM

## 2018-07-25 PROCEDURE — 97110 THERAPEUTIC EXERCISES: CPT | Performed by: PHYSICAL THERAPIST

## 2018-07-25 PROCEDURE — 97140 MANUAL THERAPY 1/> REGIONS: CPT | Performed by: PHYSICAL THERAPIST

## 2018-07-25 NOTE — PROGRESS NOTES
Physical Therapy Daily Progress Note  Visit: 8    Carin Rodriguez reports: My back has been feeling much better.  I have been able to sleep through the night and no pain.  I was able to work this week without the pain too.      Subjective     Objective   See Exercise, Manual, and Modality Logs for complete treatment.       Assessment & Plan     Assessment  Assessment details: Pt continues to improve with her back pain and functional tolerance for activities.      Plan  Plan details: Progress ROM / strengthening /stabilization / functional activity as tolerated                   Manual Therapy:    13     mins  62060;  Therapeutic Exercise:    30     mins  15311;     Neuromuscular Mynor:        mins  37142;    Therapeutic Activity:          mins  37722;     Gait Training:           mins  23884;     Ultrasound:          mins  80904;    Electrical Stimulation:         mins  87942 ( );  Dry Needling          mins self-pay    Timed Treatment:   43   mins   Total Treatment:     58   mins    Oscar Luz PT  [place] Lic. # [id number]  Physical Therapist

## 2018-08-06 ENCOUNTER — TREATMENT (OUTPATIENT)
Dept: PHYSICAL THERAPY | Facility: CLINIC | Age: 68
End: 2018-08-06

## 2018-08-06 DIAGNOSIS — G89.29 CHRONIC LEFT SACROILIAC PAIN: Primary | ICD-10-CM

## 2018-08-06 DIAGNOSIS — S39.012D LUMBOSACRAL STRAIN, SUBSEQUENT ENCOUNTER: ICD-10-CM

## 2018-08-06 DIAGNOSIS — M53.3 CHRONIC LEFT SACROILIAC PAIN: Primary | ICD-10-CM

## 2018-08-06 PROCEDURE — 97110 THERAPEUTIC EXERCISES: CPT | Performed by: PHYSICAL THERAPIST

## 2018-08-06 PROCEDURE — 97140 MANUAL THERAPY 1/> REGIONS: CPT | Performed by: PHYSICAL THERAPIST

## 2018-08-06 NOTE — PROGRESS NOTES
Physical Therapy Daily Progress Note  Visit: 9    Carin Rodriguez reports: I have been feeling better, really surprised how good I have been feeling.  I have still been performing my HEP and using the TENS.  The pain has not been waking me up any longer.      Subjective     Objective   See Exercise, Manual, and Modality Logs for complete treatment.       Assessment & Plan     Assessment  Assessment details: Pt keri Rx well with improving mobility of the lumbosacral region and increased strength for stability.      Plan  Plan details: Progress ROM / strengthening /stabilization / functional activity as tolerated                   Manual Therapy:    13     mins  81599;  Therapeutic Exercise:    30     mins  78443;     Neuromuscular Mynor:        mins  67240;    Therapeutic Activity:          mins  97697;     Gait Training:           mins  06914;     Ultrasound:          mins  04701;    Electrical Stimulation:         mins  43687 ( );  Dry Needling          mins self-pay    Timed Treatment:   43   mins   Total Treatment:     58   mins    Oscar Luz PT  [place] Lic. # [id number]  Physical Therapist

## 2018-08-07 ENCOUNTER — TELEPHONE (OUTPATIENT)
Dept: OBSTETRICS AND GYNECOLOGY | Age: 68
End: 2018-08-07

## 2018-08-09 ENCOUNTER — TREATMENT (OUTPATIENT)
Dept: PHYSICAL THERAPY | Facility: CLINIC | Age: 68
End: 2018-08-09

## 2018-08-09 DIAGNOSIS — S39.012D LUMBOSACRAL STRAIN, SUBSEQUENT ENCOUNTER: ICD-10-CM

## 2018-08-09 DIAGNOSIS — M53.3 CHRONIC LEFT SACROILIAC PAIN: Primary | ICD-10-CM

## 2018-08-09 DIAGNOSIS — G89.29 CHRONIC LEFT SACROILIAC PAIN: Primary | ICD-10-CM

## 2018-08-09 PROCEDURE — 97110 THERAPEUTIC EXERCISES: CPT | Performed by: PHYSICAL THERAPIST

## 2018-08-09 PROCEDURE — 97140 MANUAL THERAPY 1/> REGIONS: CPT | Performed by: PHYSICAL THERAPIST

## 2018-08-09 NOTE — PROGRESS NOTES
Physical Therapy Daily Progress Note  Visit: 10    Carin Rodriguez reports: I am feeling good.  I am still having a little pain with the prolonged standing at work.      Subjective     Objective   See Exercise, Manual, and Modality Logs for complete treatment.       Assessment & Plan     Assessment  Assessment details: Pt is keri Rx well with decreasing pain in her back with activity.  Pt needs cont to core stabilization to decreased the stress across the lumbosacral region in static and active positions.      Plan  Plan details: Progress ROM / strengthening /stabilization / functional activity as tolerated                   Manual Therapy:    13     mins  34527;  Therapeutic Exercise:    30     mins  29445;     Neuromuscular Mynor:        mins  76352;    Therapeutic Activity:          mins  38295;     Gait Training:           mins  15224;     Ultrasound:          mins  35390;    Electrical Stimulation:         mins  42133 ( );  Dry Needling          mins self-pay    Timed Treatment:   43   mins   Total Treatment:     58   mins    Oscar Luz PT  [place] Lic. # [id number]  Physical Therapist

## 2018-08-15 ENCOUNTER — RESULTS ENCOUNTER (OUTPATIENT)
Dept: FAMILY MEDICINE CLINIC | Facility: CLINIC | Age: 68
End: 2018-08-15

## 2018-08-15 DIAGNOSIS — I34.1 MVP (MITRAL VALVE PROLAPSE): ICD-10-CM

## 2018-08-15 DIAGNOSIS — R73.01 IMPAIRED FASTING GLUCOSE: ICD-10-CM

## 2018-08-15 DIAGNOSIS — E04.9 THYROID GOITER: ICD-10-CM

## 2018-08-15 DIAGNOSIS — F33.42 RECURRENT MAJOR DEPRESSIVE DISORDER, IN FULL REMISSION (HCC): ICD-10-CM

## 2018-08-15 DIAGNOSIS — E78.2 MIXED HYPERLIPIDEMIA: ICD-10-CM

## 2018-08-20 ENCOUNTER — TREATMENT (OUTPATIENT)
Dept: PHYSICAL THERAPY | Facility: CLINIC | Age: 68
End: 2018-08-20

## 2018-08-20 DIAGNOSIS — M53.3 CHRONIC LEFT SACROILIAC PAIN: Primary | ICD-10-CM

## 2018-08-20 DIAGNOSIS — G89.29 CHRONIC LEFT SACROILIAC PAIN: Primary | ICD-10-CM

## 2018-08-20 DIAGNOSIS — S39.012D LUMBOSACRAL STRAIN, SUBSEQUENT ENCOUNTER: ICD-10-CM

## 2018-08-20 PROCEDURE — G8978 MOBILITY CURRENT STATUS: HCPCS | Performed by: PHYSICAL THERAPIST

## 2018-08-20 PROCEDURE — 97110 THERAPEUTIC EXERCISES: CPT | Performed by: PHYSICAL THERAPIST

## 2018-08-20 PROCEDURE — G8980 MOBILITY D/C STATUS: HCPCS | Performed by: PHYSICAL THERAPIST

## 2018-08-20 NOTE — PROGRESS NOTES
Physical Therapy Daily Progress Note / Discharge Note  Initial Visit: 18   Total Visits: 11    Carin Rodriguez reports: I am having almost no pain.  I am working without issue.    Subjective Evaluation    Pain  At worst pain ratin  Location: Lumbosacral  Aggravating factors: prolonged positioning (Sporatic)           Objective       Active Range of Motion     Lumbar   Flexion: 100 (%) degrees with pain  Extension: 100 (%) degrees   Left lateral flexion: 75 (%) degrees   Right lateral flexion: 25 (%) degrees with pain  Left rotation: 75 (%) degrees   Right rotation: 75 (%) degrees     Additional Active Range of Motion Details  Flat back syndrome in flexion    Strength/Myotome Testing     Left Hip   Planes of Motion   Flexion: 5  Abduction: 5  External rotation: 5  Internal rotation: 5    Right Hip   Planes of Motion   Flexion: 5  Abduction: 5  External rotation: 5  Internal rotation: 5    Left Knee   Flexion: 5  Extension: 5    Right Knee   Flexion: 5  Extension: 5    Left Ankle/Foot   Dorsiflexion: 5  Eversion: 5  Great toe extension: 5    Right Ankle/Foot   Dorsiflexion: 5  Eversion: 5  Great toe extension: 5     See Exercise, Manual, and Modality Logs for complete treatment.     Goals   SHORT TERM GOALS: Time for Goal Achievement: 4 weeks    1.  Patient to be compliant and progression of HEP - MET                             2.  Pain level < 4/10 at worst with mentioned activities to improve function. - MET  3.  Increased thoracic, lumbar and SIJ mobility to allow for increased lumbar AROM with less pain. - MET  4.  Increased lumbar AROM to by 25% in all planes to allow for increased ease with sit-stand transfers and functional activities. - MET    LONG TERM GOALS: Time for Goal Achievement: 2 months  1.  Pt. to score < 15% on Back Index (Not Tested)  2.  Pain level < 3/10 with all listed activities to return to normal. - MET  3.  Lumbar AROM to WFL to allow for return to household & recreational  activities w/o increased symptoms. - MET  4.  (B) LE and lower abdominal strength to 5/5 to allow for pushing, pulling and activities to occur without pain (driving, sitting, household  & Job requirements) - MET    Assessment & Plan     Assessment  Assessment details: Carin has improved with decreased pain in her back and restored functional ability.  All goals have been met at this time except the LTG #1.  Skilled care is no longer indicated at this time.      Plan  Plan details: DC to HEP.                   Manual Therapy:         mins  05473;  Therapeutic Exercise:    40     mins  25230 (10 mins reassessment);     Neuromuscular Mynor:        mins  41331;    Therapeutic Activity:          mins  63093;     Gait Training:           mins  49300;     Ultrasound:          mins  98195;    Electrical Stimulation:         mins  51699 ( );  Dry Needling          mins self-pay    Timed Treatment:   40   mins   Total Treatment:     40   mins    Oscar Luz PT  [place] Lic. # [id number]  Physical Therapist

## 2018-09-01 RX ORDER — METFORMIN HYDROCHLORIDE 500 MG/1
2000 TABLET, EXTENDED RELEASE ORAL
Qty: 120 TABLET | Refills: 0 | Status: SHIPPED | OUTPATIENT
Start: 2018-09-01 | End: 2018-10-04 | Stop reason: SDUPTHER

## 2018-09-10 ENCOUNTER — OFFICE VISIT (OUTPATIENT)
Dept: OBSTETRICS AND GYNECOLOGY | Age: 68
End: 2018-09-10

## 2018-09-10 VITALS — WEIGHT: 132 LBS | DIASTOLIC BLOOD PRESSURE: 64 MMHG | BODY MASS INDEX: 21.97 KG/M2 | SYSTOLIC BLOOD PRESSURE: 102 MMHG

## 2018-09-10 DIAGNOSIS — N89.8 VAGINAL ITCHING: ICD-10-CM

## 2018-09-10 DIAGNOSIS — R30.0 BURNING WITH URINATION: Primary | ICD-10-CM

## 2018-09-10 DIAGNOSIS — N94.9 VAGINAL BURNING: ICD-10-CM

## 2018-09-10 DIAGNOSIS — N95.2 ATROPHIC VAGINITIS: ICD-10-CM

## 2018-09-10 LAB
BILIRUB BLD-MCNC: NEGATIVE MG/DL
CLARITY, POC: ABNORMAL
COLOR UR: YELLOW
GLUCOSE UR STRIP-MCNC: NEGATIVE MG/DL
KETONES UR QL: NEGATIVE
LEUKOCYTE EST, POC: ABNORMAL
NITRITE UR-MCNC: POSITIVE MG/ML
PH UR: 6 [PH] (ref 5–8)
PROT UR STRIP-MCNC: NEGATIVE MG/DL
RBC # UR STRIP: ABNORMAL /UL
SP GR UR: 1.02 (ref 1–1.03)
UROBILINOGEN UR QL: NORMAL

## 2018-09-10 PROCEDURE — 81003 URINALYSIS AUTO W/O SCOPE: CPT | Performed by: PHYSICIAN ASSISTANT

## 2018-09-10 PROCEDURE — 99213 OFFICE O/P EST LOW 20 MIN: CPT | Performed by: PHYSICIAN ASSISTANT

## 2018-09-10 RX ORDER — CIPROFLOXACIN 500 MG/1
500 TABLET, FILM COATED ORAL 2 TIMES DAILY
Qty: 10 TABLET | Refills: 0 | Status: SHIPPED | OUTPATIENT
Start: 2018-09-10 | End: 2018-09-15

## 2018-09-10 RX ORDER — CLOTRIMAZOLE AND BETAMETHASONE DIPROPIONATE 10; .64 MG/G; MG/G
CREAM TOPICAL EVERY 12 HOURS SCHEDULED
Qty: 15 G | Refills: 1 | Status: SHIPPED | OUTPATIENT
Start: 2018-09-10 | End: 2018-11-28

## 2018-09-10 NOTE — PROGRESS NOTES
Subjective     Chief Complaint   Patient presents with   • Urinary Tract Infection     burning with urination, occ.   • Vaginitis     labia, itching and burning, using a&d ointment. and monistat 7 and not better.       Carin Rodriguez is a 68 y.o.  whose LMP is Patient's last menstrual period was 2008. presents with vaginal burning  Treated for a UTI in July  Notes itching vaginally but also changes to burning  No change in med hx  utd on her exams with pcp    Pt here with c/o  No Additional Complaints Reported    The following portions of the patient's history were reviewed and updated as appropriate:vital signs, allergies, current medications, past family history, past medical history, past social history, past surgical history and problem list      Review of Systems   A comprehensive review of systems was negative except for: Genitourinary: positive for vaginal irritation, burning with urination      Objective      /64   Wt 59.9 kg (132 lb)   LMP 2008   BMI 21.97 kg/m²     Physical Exam   Genitourinary:         Genitourinary Comments: +bleeding noted at 6 o'clock position. Laceration noted, no oozing. Atrophic tissue noted as well. Internally there is white d/c noted, unsure if it is yeast or remnants of monistat used last night. Culture obtained       General:   alert, comfortable and no distress   Heart: Not performed today   Lungs: Not performed today.   Breast: Not performed today   Neck: na   Abdomen: {Not performed today   CVA: Not performed today   Pelvis: See pic  No masses noted, no ttp   Extremities: Not performed today   Neurologic: negative   Psychiatric: Normal affect, judgement, and mood, anxious       Lab Review   Labs: Urinalysis - with micro     Imaging   No data reviewed    Assessment/Plan     ASSESSMENT  1. Burning with urination    2. Vaginal itching    3. Vaginal burning    4. Atrophic vaginitis        PLAN  1.   Orders Placed This Encounter   Procedures   • Urine  Culture - Urine, Urine, Clean Catch   • POC Urinalysis Dipstick, Multipro       2. Medications prescribed this encounter:        New Medications Ordered This Visit   Medications   • clotrimazole-betamethasone (LOTRISONE) 1-0.05 % cream     Sig: Apply  topically to the appropriate area as directed Every 12 (Twelve) Hours.     Dispense:  15 g     Refill:  1   • ciprofloxacin (CIPRO) 500 MG tablet     Sig: Take 1 tablet by mouth 2 (Two) Times a Day for 5 days.     Dispense:  10 tablet     Refill:  0       3. Pt requests cipro for her UTI.  Also start lotrisone prn and can use a topical emollient to protect the irritated area of there mucous membrane    4. Enc to increase hydration, monitor sugar intake and push high protein, low carb diet.    5. She has her annual in Nov      Follow up: 2 month(s)    PARAM Victor  9/10/2018

## 2018-09-13 ENCOUNTER — TELEPHONE (OUTPATIENT)
Dept: OBSTETRICS AND GYNECOLOGY | Age: 68
End: 2018-09-13

## 2018-09-13 LAB
A VAGINAE DNA VAG QL NAA+PROBE: NORMAL SCORE
ALBUMIN SERPL-MCNC: 4.9 G/DL (ref 3.5–5.2)
ALBUMIN/GLOB SERPL: 2.2 G/DL
ALP SERPL-CCNC: 76 U/L (ref 39–117)
ALT SERPL-CCNC: 16 U/L (ref 1–33)
AST SERPL-CCNC: 16 U/L (ref 1–32)
BACTERIA UR CULT: ABNORMAL
BACTERIA UR CULT: ABNORMAL
BILIRUB SERPL-MCNC: 0.3 MG/DL (ref 0.1–1.2)
BUN SERPL-MCNC: 29 MG/DL (ref 8–23)
BUN/CREAT SERPL: 35.4 (ref 7–25)
BVAB2 DNA VAG QL NAA+PROBE: NORMAL SCORE
C ALBICANS DNA VAG QL NAA+PROBE: NEGATIVE
C GLABRATA DNA VAG QL NAA+PROBE: NEGATIVE
CALCIUM SERPL-MCNC: 9.2 MG/DL (ref 8.6–10.5)
CHLORIDE SERPL-SCNC: 102 MMOL/L (ref 98–107)
CO2 SERPL-SCNC: 27.9 MMOL/L (ref 22–29)
CREAT SERPL-MCNC: 0.82 MG/DL (ref 0.57–1)
GLOBULIN SER CALC-MCNC: 2.2 GM/DL
GLUCOSE SERPL-MCNC: 101 MG/DL (ref 65–99)
HBA1C MFR BLD: 5.8 % (ref 4.8–5.6)
MEGA1 DNA VAG QL NAA+PROBE: NORMAL SCORE
OTHER ANTIBIOTIC SUSC ISLT: ABNORMAL
POTASSIUM SERPL-SCNC: 4.8 MMOL/L (ref 3.5–5.2)
PROT SERPL-MCNC: 7.1 G/DL (ref 6–8.5)
SODIUM SERPL-SCNC: 143 MMOL/L (ref 136–145)

## 2018-09-13 NOTE — TELEPHONE ENCOUNTER
----- Message from PARAM Franco sent at 9/13/2018  9:01 AM EDT -----  Let her know her vaginal swab is neg for BV and yeast. She can c/w POC we discussed at her appt

## 2018-09-17 ENCOUNTER — TELEPHONE (OUTPATIENT)
Dept: OBSTETRICS AND GYNECOLOGY | Age: 68
End: 2018-09-17

## 2018-09-17 NOTE — TELEPHONE ENCOUNTER
----- Message from PARAM Franco sent at 9/17/2018 10:53 AM EDT -----  Let her know her urine does show evidence of infection, I already treated her with cipro at her appt so she could complete the dosing.

## 2018-10-01 ENCOUNTER — OFFICE VISIT (OUTPATIENT)
Dept: FAMILY MEDICINE CLINIC | Facility: CLINIC | Age: 68
End: 2018-10-01

## 2018-10-01 VITALS
DIASTOLIC BLOOD PRESSURE: 60 MMHG | TEMPERATURE: 97.9 F | WEIGHT: 136 LBS | SYSTOLIC BLOOD PRESSURE: 100 MMHG | OXYGEN SATURATION: 98 % | BODY MASS INDEX: 22.66 KG/M2 | HEIGHT: 65 IN | RESPIRATION RATE: 16 BRPM | HEART RATE: 70 BPM

## 2018-10-01 DIAGNOSIS — M51.36 DDD (DEGENERATIVE DISC DISEASE), LUMBAR: ICD-10-CM

## 2018-10-01 DIAGNOSIS — M25.552 BILATERAL HIP PAIN: Primary | ICD-10-CM

## 2018-10-01 DIAGNOSIS — M25.551 BILATERAL HIP PAIN: Primary | ICD-10-CM

## 2018-10-01 PROCEDURE — 72170 X-RAY EXAM OF PELVIS: CPT | Performed by: NURSE PRACTITIONER

## 2018-10-01 PROCEDURE — 72110 X-RAY EXAM L-2 SPINE 4/>VWS: CPT | Performed by: NURSE PRACTITIONER

## 2018-10-01 PROCEDURE — 99214 OFFICE O/P EST MOD 30 MIN: CPT | Performed by: NURSE PRACTITIONER

## 2018-10-01 NOTE — PROGRESS NOTES
Subjective   Carin Rodriguez is a 68 y.o. female.     History of Present Illness   Carin Rodriguez 68 y.o. female presents for evaluation and treatment of  low back and bilateral hip. The patient first noted symptoms several months ago. It was not related to known event.  The pain intensity is moderate , and is located sacroiliac. The pain is described as aching and sharp and occurs constantly. The symptoms are progressive. Symptoms are exacerbated by lying on either side. Factors which relieve the pain include nothing helps. Other associated symptoms include aching pain in the right leg and aching pain in the left leg. Previous history of symptoms: the problem is intermittent for a few years. Treatment efforts have included NSAID's and Physical Therapy treatment , without relief.    The following portions of the patient's history were reviewed and updated as appropriate: allergies, current medications, past family history, past medical history, past social history, past surgical history and problem list.    Review of Systems   Musculoskeletal: Positive for back pain. Negative for gait problem.   Neurological: Positive for weakness. Negative for numbness.       Objective   Physical Exam   Constitutional: She is oriented to person, place, and time. She appears well-developed and well-nourished.   Pulmonary/Chest: Effort normal.   Musculoskeletal:   Negative straight leg raise bilaterally    Neurological: She is alert and oriented to person, place, and time. No sensory deficit.   Reflex Scores:       Patellar reflexes are 2+ on the right side and 2+ on the left side.       Achilles reflexes are 2+ on the right side and 2+ on the left side.  Psychiatric: She has a normal mood and affect. Judgment normal.   Nursing note and vitals reviewed.  4 view xray of lumbar spine and 1 view of pelvis ordered and reviewed by me.  Joint space narrowing throughout lumbar spine with osteophyte formation.  Comparison is interpretation  only from 2016.     Assessment/Plan    Carin was seen today for hip pain.    Diagnoses and all orders for this visit:    Bilateral hip pain  -     XR Spine Lumbar 4+ View (In Office)  -     XR Pelvis 1 or 2 View (In Office)  -     MRI Lumbar Spine Without Contrast    DDD (degenerative disc disease), lumbar  -     MRI Lumbar Spine Without Contrast      Gave referral information for Dr. Reynoso

## 2018-10-04 RX ORDER — METFORMIN HYDROCHLORIDE 500 MG/1
2000 TABLET, EXTENDED RELEASE ORAL
Qty: 120 TABLET | Refills: 0 | Status: SHIPPED | OUTPATIENT
Start: 2018-10-04 | End: 2018-11-06 | Stop reason: SDUPTHER

## 2018-10-12 ENCOUNTER — HOSPITAL ENCOUNTER (OUTPATIENT)
Dept: MRI IMAGING | Facility: HOSPITAL | Age: 68
Discharge: HOME | End: 2018-10-12
Admitting: NURSE PRACTITIONER

## 2018-10-12 PROCEDURE — 72148 MRI LUMBAR SPINE W/O DYE: CPT

## 2018-10-18 DIAGNOSIS — M54.50 LOW BACK PAIN WITHOUT SCIATICA, UNSPECIFIED BACK PAIN LATERALITY, UNSPECIFIED CHRONICITY: Primary | ICD-10-CM

## 2018-11-06 RX ORDER — METFORMIN HYDROCHLORIDE 500 MG/1
2000 TABLET, EXTENDED RELEASE ORAL
Qty: 120 TABLET | Refills: 0 | Status: SHIPPED | OUTPATIENT
Start: 2018-11-06 | End: 2018-11-07

## 2018-11-07 ENCOUNTER — OFFICE VISIT (OUTPATIENT)
Dept: FAMILY MEDICINE CLINIC | Facility: CLINIC | Age: 68
End: 2018-11-07

## 2018-11-07 VITALS
HEART RATE: 63 BPM | OXYGEN SATURATION: 99 % | SYSTOLIC BLOOD PRESSURE: 110 MMHG | BODY MASS INDEX: 22.33 KG/M2 | DIASTOLIC BLOOD PRESSURE: 62 MMHG | WEIGHT: 134 LBS | HEIGHT: 65 IN | TEMPERATURE: 98.3 F | RESPIRATION RATE: 16 BRPM

## 2018-11-07 DIAGNOSIS — E78.2 MIXED HYPERLIPIDEMIA: ICD-10-CM

## 2018-11-07 DIAGNOSIS — R82.998 LEUKOCYTES IN URINE: ICD-10-CM

## 2018-11-07 DIAGNOSIS — E04.9 THYROID GOITER: ICD-10-CM

## 2018-11-07 DIAGNOSIS — R30.0 BURNING WITH URINATION: Primary | ICD-10-CM

## 2018-11-07 DIAGNOSIS — E55.9 VITAMIN D DEFICIENCY: ICD-10-CM

## 2018-11-07 DIAGNOSIS — R73.01 IMPAIRED FASTING GLUCOSE: ICD-10-CM

## 2018-11-07 PROBLEM — F33.42 RECURRENT MAJOR DEPRESSIVE DISORDER, IN FULL REMISSION (HCC): Status: ACTIVE | Noted: 2018-01-08

## 2018-11-07 LAB
BILIRUB BLD-MCNC: NEGATIVE MG/DL
CLARITY, POC: CLEAR
COLOR UR: YELLOW
GLUCOSE UR STRIP-MCNC: NEGATIVE MG/DL
KETONES UR QL: ABNORMAL
LEUKOCYTE EST, POC: ABNORMAL
NITRITE UR-MCNC: POSITIVE MG/ML
PH UR: 7 [PH] (ref 5–8)
PROT UR STRIP-MCNC: NEGATIVE MG/DL
RBC # UR STRIP: NEGATIVE /UL
SP GR UR: 1.02 (ref 1–1.03)
UROBILINOGEN UR QL: NORMAL

## 2018-11-07 PROCEDURE — 81003 URINALYSIS AUTO W/O SCOPE: CPT | Performed by: PHYSICIAN ASSISTANT

## 2018-11-07 PROCEDURE — 99214 OFFICE O/P EST MOD 30 MIN: CPT | Performed by: PHYSICIAN ASSISTANT

## 2018-11-07 RX ORDER — CIPROFLOXACIN 250 MG/1
250 TABLET, FILM COATED ORAL EVERY 12 HOURS SCHEDULED
Qty: 10 TABLET | Refills: 1 | Status: SHIPPED | OUTPATIENT
Start: 2018-11-07 | End: 2018-11-28

## 2018-11-07 RX ORDER — METFORMIN HYDROCHLORIDE 500 MG/1
TABLET, EXTENDED RELEASE ORAL
Qty: 180 TABLET | Refills: 1 | Status: SHIPPED | OUTPATIENT
Start: 2018-11-07 | End: 2019-07-26 | Stop reason: SDUPTHER

## 2018-11-07 NOTE — PATIENT INSTRUCTIONS
Low glycemic index diet  Exercise 30 minutes most days of the week  Make sure you get results on any labs or tests we ordered today  We discussed medications and how to take them as prescribed  Sleep 6-8 hours each night if possible  If you have not signed up for [employer], please activate your code ASAP from your After Visit Summary today    LDL goal <100  LDL goal if heart disease <70  HDL goal >60  Triglyceride goal <150  BP goal =<130/80  Fasting glucose <100      Urinary Tract Infection, Adult  A urinary tract infection (UTI) is an infection of any part of the urinary tract, which includes the kidneys, ureters, bladder, and urethra. These organs make, store, and get rid of urine in the body. UTI can be a bladder infection (cystitis) or kidney infection (pyelonephritis).  What are the causes?  This infection may be caused by fungi, viruses, or bacteria. Bacteria are the most common cause of UTIs. This condition can also be caused by repeated incomplete emptying of the bladder during urination.  What increases the risk?  This condition is more likely to develop if:  · You ignore your need to urinate or hold urine for long periods of time.  · You do not empty your bladder completely during urination.  · You wipe back to front after urinating or having a bowel movement, if you are female.  · You are uncircumcised, if you are male.  · You are constipated.  · You have a urinary catheter that stays in place (indwelling).  · You have a weak defense (immune) system.  · You have a medical condition that affects your bowels, kidneys, or bladder.  · You have diabetes.  · You take antibiotic medicines frequently or for long periods of time, and the antibiotics no longer work well against certain types of infections (antibiotic resistance).  · You take medicines that irritate your urinary tract.  · You are exposed to chemicals that irritate your urinary tract.  · You are female.    What are the signs or symptoms?  Symptoms of  this condition include:  · Fever.  · Frequent urination or passing small amounts of urine frequently.  · Needing to urinate urgently.  · Pain or burning with urination.  · Urine that smells bad or unusual.  · Cloudy urine.  · Pain in the lower abdomen or back.  · Trouble urinating.  · Blood in the urine.  · Vomiting or being less hungry than normal.  · Diarrhea or abdominal pain.  · Vaginal discharge, if you are female.    How is this diagnosed?  This condition is diagnosed with a medical history and physical exam. You will also need to provide a urine sample to test your urine. Other tests may be done, including:  · Blood tests.  · Sexually transmitted disease (STD) testing.    If you have had more than one UTI, a cystoscopy or imaging studies may be done to determine the cause of the infections.  How is this treated?  Treatment for this condition often includes a combination of two or more of the following:  · Antibiotic medicine.  · Other medicines to treat less common causes of UTI.  · Over-the-counter medicines to treat pain.  · Drinking enough water to stay hydrated.    Follow these instructions at home:  · Take over-the-counter and prescription medicines only as told by your health care provider.  · If you were prescribed an antibiotic, take it as told by your health care provider. Do not stop taking the antibiotic even if you start to feel better.  · Avoid alcohol, caffeine, tea, and carbonated beverages. They can irritate your bladder.  · Drink enough fluid to keep your urine clear or pale yellow.  · Keep all follow-up visits as told by your health care provider. This is important.  · Make sure to:  ? Empty your bladder often and completely. Do not hold urine for long periods of time.  ? Empty your bladder before and after sex.  ? Wipe from front to back after a bowel movement if you are female. Use each tissue one time when you wipe.  Contact a health care provider if:  · You have back pain.  · You have a  fever.  · You feel nauseous or vomit.  · Your symptoms do not get better after 3 days.  · Your symptoms go away and then return.  Get help right away if:  · You have severe back pain or lower abdominal pain.  · You are vomiting and cannot keep down any medicines or water.  This information is not intended to replace advice given to you by your health care provider. Make sure you discuss any questions you have with your health care provider.  Document Released: 09/27/2006 Document Revised: 05/31/2017 Document Reviewed: 11/07/2016  [employer] Interactive Patient Education © 2018 [employer]

## 2018-11-07 NOTE — PROGRESS NOTES
Subjective   Carin Rodriguez is a 68 y.o. female.     History of Present Illness   Carin Rodriguez 68 y.o. female who presents today for routine follow up check and medication refills.  she has a history of   Patient Active Problem List   Diagnosis   • Impaired fasting glucose   • Recurrent major depressive disorder, in full remission (CMS/HCC)   • Hyperlipidemia   • Thyroid goiter   • History of ovarian cyst   • Vaginal atrophy   • Osteopenia   • MVP (mitral valve prolapse)   .  Since the last visit, she has overall felt well.  She has Impaired fasting glucose and will continue close lab follow up to watch for DMII, Hyperlipidemia and is well controlled on medication, Vitamin D deficiency and will update labs to confirm level is at goal >30 and thyroid goiter and sees ENT Dr. Amos.  she has been compliant with current medications have reviewed them.  The patient denies medication side effects.  Sees ENT for the thyroid nodules  Echo due 2020:  MVP    Having back pain and had recent MRI-----back and leg pain    Sees ortho for right arm  Results for orders placed or performed in visit on 11/07/18   POC Urinalysis Dipstick, Automated   Result Value Ref Range    Color Yellow Yellow, Straw, Dark Yellow, Doretha    Clarity, UA Clear Clear    Specific Gravity  1.020 1.005 - 1.030    pH, Urine 7.0 5.0 - 8.0    Leukocytes Moderate (2+) (A) Negative    Nitrite, UA Positive (A) Negative    Protein, POC Negative Negative mg/dL    Glucose, UA Negative Negative, 1000 mg/dL (3+) mg/dL    Ketones, UA Trace (A) Negative    Urobilinogen, UA Normal Normal    Bilirubin Negative Negative    Blood, UA Negative Negative       Carin Rodriguez 68 y.o.female complains of urinary symptoms. she complains of dysuria, urgency and frequency. She has had symptoms for 1 day. The symptoms are moderate.  Patient denies fever, gross blood in urine, nausea, vomiting and abdominal pain.  Patient has tried  increasing fluids for relief of  symptoms.  Patient does have a history of recurrent UTI. Patient does not have a history of pyelonephritis.       Stop antibiotic if tendon pain develops.  This medication can cause tendon rupture, though rare.      The following portions of the patient's history were reviewed and updated as appropriate: allergies, current medications, past family history, past medical history, past social history, past surgical history and problem list.    Review of Systems   Constitutional: Negative for activity change, appetite change and unexpected weight change.   HENT: Negative for nosebleeds and trouble swallowing.    Eyes: Negative for pain and visual disturbance.   Respiratory: Negative for chest tightness, shortness of breath and wheezing.    Cardiovascular: Negative for chest pain and palpitations.   Gastrointestinal: Negative for abdominal pain and blood in stool.   Endocrine: Negative.    Genitourinary: Positive for dysuria, frequency and urgency. Negative for difficulty urinating and hematuria.   Musculoskeletal: Positive for arthralgias and back pain. Negative for joint swelling.   Skin: Negative for color change and rash.   Allergic/Immunologic: Negative.    Neurological: Negative for syncope and speech difficulty.   Hematological: Negative for adenopathy.   Psychiatric/Behavioral: Negative for agitation and confusion.   All other systems reviewed and are negative.      Objective   Physical Exam   Constitutional: She is oriented to person, place, and time. She appears well-developed and well-nourished. No distress.   HENT:   Head: Normocephalic and atraumatic.   Right Ear: External ear normal.   Left Ear: External ear normal.   Nose: Nose normal.   Mouth/Throat: Oropharynx is clear and moist. No oropharyngeal exudate.   Eyes: Pupils are equal, round, and reactive to light. Conjunctivae and EOM are normal. No scleral icterus.   Neck: Normal range of motion. Neck supple. Thyromegaly present.   Cardiovascular: Normal  rate, regular rhythm, normal heart sounds and intact distal pulses.    No murmur heard.  Pulmonary/Chest: Effort normal and breath sounds normal. No respiratory distress. She has no wheezes. She has no rales.   Abdominal: Soft. Bowel sounds are normal. There is no tenderness.   Musculoskeletal: Normal range of motion. She exhibits no deformity.   Neurological: She is alert and oriented to person, place, and time. Coordination normal.   Skin: Skin is warm and dry.   Psychiatric: She has a normal mood and affect. Her behavior is normal. Judgment and thought content normal.   Nursing note and vitals reviewed.      Assessment/Plan   Problems Addressed this Visit        Cardiovascular and Mediastinum    Hyperlipidemia    Relevant Orders    Comprehensive metabolic panel    Lipid panel    CBC and Differential    TSH    Hemoglobin A1c    T3, Free    T4, Free    Vitamin B12    Folate    Vitamin D 25 Hydroxy       Endocrine    Impaired fasting glucose    Relevant Orders    Comprehensive metabolic panel    Lipid panel    CBC and Differential    TSH    Hemoglobin A1c    T3, Free    T4, Free    Vitamin B12    Folate    Vitamin D 25 Hydroxy    Thyroid goiter    Relevant Orders    Comprehensive metabolic panel    Lipid panel    CBC and Differential    TSH    Hemoglobin A1c    T3, Free    T4, Free    Vitamin B12    Folate    Vitamin D 25 Hydroxy      Other Visit Diagnoses     Burning with urination    -  Primary    Relevant Orders    POC Urinalysis Dipstick, Automated (Completed)    Comprehensive metabolic panel    Lipid panel    CBC and Differential    TSH    Hemoglobin A1c    T3, Free    T4, Free    Vitamin B12    Folate    Vitamin D 25 Hydroxy    Leukocytes in urine        Relevant Orders    Urinalysis With Microscopic - Urine, Clean Catch    Urine Culture - Urine, Urine, Clean Catch    Vitamin D deficiency        Relevant Orders    Comprehensive metabolic panel    Lipid panel    CBC and Differential    TSH    Hemoglobin A1c     T3, Free    T4, Free    Vitamin B12    Folate    Vitamin D 25 Hydroxy

## 2018-11-11 LAB
APPEARANCE UR: (no result)
BACTERIA #/AREA URNS HPF: NORMAL /[HPF]
BACTERIA UR CULT: ABNORMAL
BACTERIA UR CULT: ABNORMAL
BILIRUB UR QL STRIP: NEGATIVE
COLOR UR: YELLOW
EPI CELLS #/AREA URNS HPF: NORMAL /HPF
GLUCOSE UR QL: NEGATIVE
HGB UR QL STRIP: NEGATIVE
KETONES UR QL STRIP: NEGATIVE
LEUKOCYTE ESTERASE UR QL STRIP: (no result)
MICRO URNS: (no result)
MUCOUS THREADS URNS QL MICRO: PRESENT
NITRITE UR QL STRIP: POSITIVE
OTHER ANTIBIOTIC SUSC ISLT: ABNORMAL
PH UR STRIP: 6.5 [PH] (ref 5–7.5)
PROT UR QL STRIP: NEGATIVE
RBC #/AREA URNS HPF: NORMAL /HPF
SP GR UR: 1.02 (ref 1–1.03)
UROBILINOGEN UR STRIP-MCNC: 0.2 MG/DL (ref 0.2–1)
WBC #/AREA URNS HPF: NORMAL /HPF

## 2018-11-19 ENCOUNTER — OFFICE VISIT (OUTPATIENT)
Dept: OBSTETRICS AND GYNECOLOGY | Age: 68
End: 2018-11-19

## 2018-11-19 ENCOUNTER — PROCEDURE VISIT (OUTPATIENT)
Dept: OBSTETRICS AND GYNECOLOGY | Age: 68
End: 2018-11-19

## 2018-11-19 ENCOUNTER — APPOINTMENT (OUTPATIENT)
Dept: WOMENS IMAGING | Facility: HOSPITAL | Age: 68
End: 2018-11-19

## 2018-11-19 VITALS
BODY MASS INDEX: 22.49 KG/M2 | WEIGHT: 135 LBS | HEIGHT: 65 IN | DIASTOLIC BLOOD PRESSURE: 68 MMHG | SYSTOLIC BLOOD PRESSURE: 100 MMHG

## 2018-11-19 DIAGNOSIS — N95.2 VAGINAL ATROPHY: ICD-10-CM

## 2018-11-19 DIAGNOSIS — Z12.31 VISIT FOR SCREENING MAMMOGRAM: Primary | ICD-10-CM

## 2018-11-19 DIAGNOSIS — Z78.0 POST-MENOPAUSAL: Primary | ICD-10-CM

## 2018-11-19 DIAGNOSIS — Z92.89 PERSONAL HISTORY OF OTHER MEDICAL TREATMENT: ICD-10-CM

## 2018-11-19 DIAGNOSIS — N95.2 VAGINAL ATROPHY: Primary | ICD-10-CM

## 2018-11-19 DIAGNOSIS — Z12.4 SCREENING FOR MALIGNANT NEOPLASM OF CERVIX: ICD-10-CM

## 2018-11-19 DIAGNOSIS — M85.80 OSTEOPENIA, UNSPECIFIED LOCATION: ICD-10-CM

## 2018-11-19 DIAGNOSIS — Z01.411 ENCOUNTER FOR GYNECOLOGICAL EXAMINATION WITH ABNORMAL FINDING: Primary | ICD-10-CM

## 2018-11-19 PROBLEM — Z87.42 HISTORY OF OVARIAN CYST: Status: RESOLVED | Noted: 2018-02-21 | Resolved: 2018-11-19

## 2018-11-19 PROCEDURE — 77080 DXA BONE DENSITY AXIAL: CPT | Performed by: OBSTETRICS & GYNECOLOGY

## 2018-11-19 PROCEDURE — 77067 SCR MAMMO BI INCL CAD: CPT | Performed by: OBSTETRICS & GYNECOLOGY

## 2018-11-19 PROCEDURE — 77067 SCR MAMMO BI INCL CAD: CPT | Performed by: RADIOLOGY

## 2018-11-19 PROCEDURE — G0101 CA SCREEN;PELVIC/BREAST EXAM: HCPCS | Performed by: OBSTETRICS & GYNECOLOGY

## 2018-11-19 NOTE — PROGRESS NOTES
"Subjective   Carin Rodriguez is a 68 y.o. female Annual visit , us today, mmg and dexa today.  DEXA scan normal today (Osteopenia in 2016).  US unable to be performed due to vaginal atrophy.  Cyst was not on US 2/2018 - explained does not need additional US. Patient desires pap including HPV. Had colonoscopy 3/2016 - due in 10 years       History of Present Illness    The following portions of the patient's history were reviewed and updated as appropriate: allergies, current medications, past family history, past medical history, past social history, past surgical history and problem list.    Review of Systems   Constitutional: Negative for chills and fever.   Gastrointestinal: Negative for abdominal distention and abdominal pain.   Genitourinary: Negative for dysuria, menstrual problem, pelvic pain, vaginal bleeding, vaginal discharge and vaginal pain.   All other systems reviewed and are negative.  /68   Ht 165.1 cm (65\")   Wt 61.2 kg (135 lb)   LMP 02/22/2008   BMI 22.47 kg/m²       Objective   Physical Exam   Constitutional: She is oriented to person, place, and time. She appears well-developed and well-nourished.   Neck: Normal range of motion. Neck supple. No thyromegaly present.   Cardiovascular: Normal rate and regular rhythm.   Pulmonary/Chest: Effort normal and breath sounds normal. Right breast exhibits no mass, no nipple discharge, no skin change and no tenderness. Left breast exhibits no mass, no nipple discharge, no skin change and no tenderness.   Abdominal: Soft. Bowel sounds are normal. She exhibits no distension. There is no tenderness.   Genitourinary: Uterus normal. Pelvic exam was performed with patient supine. Uterus is not tender. Cervix exhibits no friability. Right adnexum displays no mass and no tenderness. Left adnexum displays no mass and no tenderness. No vaginal discharge found.   Genitourinary Comments: Tissue appears healthier although pale with petechia c/w atrophy   "   Musculoskeletal: Normal range of motion. She exhibits no edema.   Neurological: She is alert and oriented to person, place, and time.   Skin: Skin is warm and dry. No rash noted.   Psychiatric: She has a normal mood and affect. Her behavior is normal.   Nursing note and vitals reviewed.        Assessment/Plan    Carin was seen today for gynecologic exam.    Diagnoses and all orders for this visit:    Encounter for gynecological examination with abnormal finding    Osteopenia, unspecified location    Vaginal atrophy    Screening for malignant neoplasm of cervix  -     IgP, Aptima HPV    Personal history of other medical treatment  -     IgP, Aptima HPV      Cont Intrarosa  Counseling was given to patient for the following topics: diagnostic results, impressions and importance of treatment compliance .    Counseling was given to patient for the following topics: calcium and vit D and weight-bearing exercises .

## 2018-11-21 LAB
CYTOLOGIST CVX/VAG CYTO: NORMAL
CYTOLOGY CVX/VAG DOC THIN PREP: NORMAL
DX ICD CODE: NORMAL
HIV 1 & 2 AB SER-IMP: NORMAL
HPV I/H RISK 4 DNA CVX QL PROBE+SIG AMP: NEGATIVE
OTHER STN SPEC: NORMAL
PATH REPORT.FINAL DX SPEC: NORMAL
STAT OF ADQ CVX/VAG CYTO-IMP: NORMAL

## 2018-11-26 ENCOUNTER — OFFICE VISIT (OUTPATIENT)
Dept: ORTHOPEDIC SURGERY | Facility: CLINIC | Age: 68
End: 2018-11-26

## 2018-11-26 ENCOUNTER — TELEPHONE (OUTPATIENT)
Dept: OBSTETRICS AND GYNECOLOGY | Age: 68
End: 2018-11-26

## 2018-11-26 VITALS — WEIGHT: 130 LBS | HEIGHT: 65 IN | BODY MASS INDEX: 21.66 KG/M2

## 2018-11-26 DIAGNOSIS — M77.11 RIGHT TENNIS ELBOW: Primary | ICD-10-CM

## 2018-11-26 DIAGNOSIS — M25.519 ARTHRALGIA OF SHOULDER, UNSPECIFIED LATERALITY: ICD-10-CM

## 2018-11-26 DIAGNOSIS — M25.50 ARTHRALGIA, UNSPECIFIED JOINT: ICD-10-CM

## 2018-11-26 PROCEDURE — 99213 OFFICE O/P EST LOW 20 MIN: CPT | Performed by: ORTHOPAEDIC SURGERY

## 2018-11-26 PROCEDURE — 20605 DRAIN/INJ JOINT/BURSA W/O US: CPT | Performed by: ORTHOPAEDIC SURGERY

## 2018-11-26 RX ORDER — ALPRAZOLAM 0.25 MG/1
TABLET ORAL
Refills: 0 | COMMUNITY
Start: 2018-11-12

## 2018-11-26 RX ADMIN — LIDOCAINE HYDROCHLORIDE 1 ML: 20 INJECTION, SOLUTION EPIDURAL; INFILTRATION; INTRACAUDAL; PERINEURAL at 14:44

## 2018-11-26 RX ADMIN — METHYLPREDNISOLONE ACETATE 80 MG: 80 INJECTION, SUSPENSION INTRA-ARTICULAR; INTRALESIONAL; INTRAMUSCULAR; SOFT TISSUE at 14:44

## 2018-11-26 NOTE — PROGRESS NOTES
"Carin Rodriguez     : 1950     MRN: [id number]     DATE: 2018    Chief Complaints:  Diffuse joint aches and pains, recurrent right elbow pain    History of Present Illness:     68 y.o. female patient who comes in today for follow-up of her right elbow but she also has complaints of diffuse joint aches and pains.  She reports bilateral knee, bilateral hip, bilateral shoulder, bilateral elbow and low back pain.  She says that all of these issues are \"feeling okay today\".  Her right elbow pain is the same as that which I saw her for this past March.  We did an injection.  It helped tremendously and she would like to get that repeated.  She is having similar symptoms on the left elbow but they are very mild.  She reports some morning pain and stiffness.  She also reports some fatigue associated with her diffuse joint aches and pains.    Allergies:   Allergies   Allergen Reactions   • Clindamycin/Lincomycin Diarrhea       Medications:   Home Medications:    Current Outpatient Medications:   •  ALPRAZolam (XANAX) 0.25 MG tablet, TK 1 T PO  UTD FOR ANXIETY WITH FLYING, Disp: , Rfl: 0  •  aspirin 81 MG EC tablet, Take 81 mg by mouth Daily., Disp: , Rfl:   •  atorvastatin (LIPITOR) 10 MG tablet, Take 1 tablet by mouth Daily. For cholesterol, Disp: 90 tablet, Rfl: 3  •  Bismuth Subsalicylate 262 MG tablet, Take 262 mg by mouth 3 (Three) Times a Day As Needed (for diarrhea)., Disp: 90 each, Rfl: 3  •  cholestyramine (QUESTRAN) 4 GM/DOSE powder, Take 1 packet by mouth Daily. mixed with a liquid, Disp: 378 g, Rfl: 5  •  clotrimazole-betamethasone (LOTRISONE) 1-0.05 % cream, Apply  topically to the appropriate area as directed Every 12 (Twelve) Hours., Disp: 15 g, Rfl: 1  •  metFORMIN ER (GLUCOPHAGE-XR) 500 MG 24 hr tablet, 2 PO daily with food for impaired fasting glucose, Disp: 180 tablet, Rfl: 1  •  Prasterone (INTRAROSA) 6.5 MG insert, Insert 1 tablet into the vagina Every Night., Disp: 30 each, Rfl: 3  •  " propranolol LA (INDERAL LA) 60 MG 24 hr capsule, Take 1 capsule by mouth Daily. For heart (put on file), Disp: 90 capsule, Rfl: 1  •  RESTASIS 0.05 % ophthalmic emulsion, INSTILL ONE DROP INTO BOTH EYES BID, Disp: , Rfl: 3  •  sertraline (ZOLOFT) 100 MG tablet, Take 1 tablet by mouth Daily. 1.5 tabs PO daily for depression, Disp: 135 tablet, Rfl: 3  •  ciprofloxacin (CIPRO) 250 MG tablet, Take 1 tablet by mouth Every 12 (Twelve) Hours. One PO BID for infection, Disp: 10 tablet, Rfl: 1  •  traMADol (ULTRAM) 50 MG tablet, Take 1-2 Tablets by mouth every 4 (four) hours as needed for pain., Disp: 30 tablet, Rfl: 0  •  zolpidem (AMBIEN) 5 MG tablet, TK 1 T PO  QHS, Disp: , Rfl: 5  Past Medical History:   Diagnosis Date   • Allergic    • Depression    • Heart palpitations    • Hyperlipidemia    • Microscopic colitis    • MVP (mitral valve prolapse) 01/24/2018    repeat echo 1-2020   • Urinary tract infection      Past Surgical History:   Procedure Laterality Date   • BREAST BIOPSY      several on [facility] breast benign many years ago   • CATARACT EXTRACTION     • COLONOSCOPY  03/25/2016    due in 10 years   • TONSILLECTOMY     • UPPER GASTROINTESTINAL ENDOSCOPY  03/25/2016    normal per patient   • WISDOM TOOTH EXTRACTION       Social History     Occupational History   • Occupation: retired   Tobacco Use   • Smoking status: Never Smoker   • Smokeless tobacco: Never Used   Substance and Sexual Activity   • Alcohol use: Yes     Comment: social   • Drug use: No   • Sexual activity: Not Currently      Social History     Social History Narrative   • Not on file     Family History   Problem Relation Age of Onset   • Diabetes Brother    • Depression Brother    • Prostate cancer Father 60   • Breast cancer Neg Hx    • Ovarian cancer Neg Hx    • Uterine cancer Neg Hx    • Colon cancer Neg Hx    • Melanoma Neg Hx    • Deep vein thrombosis Neg Hx    • Pulmonary embolism Neg Hx        Review of Systems:      Constitutional: Denies fever,  "shaking or chills   Eyes: Denies change in visual acuity   HEENT: Denies nasal congestion or sore throat   Respiratory: Denies cough or shortness of breath   Cardiovascular: Denies chest pain or edema  Endocrine: Denies tremors, palpitations, intolerance of heat or cold, polyuria, polydipsia.  GI: Denies abdominal pain, nausea, vomiting, bloody stools or diarrhea  : Denies frequency, urgency, incontinence, retention, or nocturia.  Musculoskeletal: Denies numbness, tingling or loss of motor function  Integument: Denies rash, lesion or ulceration   Neurologic: Denies headache or focal weakness, deficits  Heme: Denies spontaneous or excessive bleeding, epistaxis, hematuria, melena, fatigue, enlarged or tender lymph nodes.      All other pertinent positives and negatives as noted above in HPI.    Physical Exam: 68 y.o. female    Vitals:    11/26/18 1357   Weight: 59 kg (130 lb)   Height: 165.1 cm (65\")     General:  Patient is awake and alert.  Appears in no acute distress or discomfort.    Psych:  Affect and demeanor are appropriate.    Eyes:  Conjunctiva and sclera appear grossly normal.  Eyes track well and EOM seem to be intact.    Ears:  No gross abnormalities.  Hearing adequate for the exam.    Cardiovascular:  Regular rate and rhythm.    Lungs:  Good chest expansion.  Breathing unlabored.    Extremities:  Right elbow skin appears benign.  No obvious lesions, swellings, masses or adenopathy.  Focal tenderness noted over the lateral epicondyle.  No tenderness over the radial tunnel or medial side.  Full elbow motion.  No instability.  Good strength with elbow flexion, extension, supination, pronation.  Pain at the lateral epicondyle with resisted wrist extension.  Good strength in hand.  Sensation intact.  Palpable radial pulse with good skin turgor and brisk capillary refill.    DIAGNOSTIC STUDIES    Xrays:  None taken today    ASSESSMENT: 1.  Recurrent right elbow lateral epicondylitis  2.  Diffuse myalgias and " arthralgias of unclear etiology    PLAN:  She has a litany of complaints today.  I'm concerned that she may have a rheumatologic problem.  This could also be related to low thyroid or low vitamin D.  I recommend that we start with a rheumatoid panel.  I will order this for her and then we will contact her with the results.    We discussed options for her bilateral elbow pain.  She wanted to get the injection repeated on the right.  The risks, benefits and alternatives were discussed.  She consented.    Dusty Mitchell MD    11/26/2018    Medium Joint Arthrocentesis: R elbow  Date/Time: 11/26/2018 2:44 PM  Consent given by: patient  Site marked: site marked  Timeout: Immediately prior to procedure a time out was called to verify the correct patient, procedure, equipment, support staff and site/side marked as required   Supporting Documentation  Indications: pain   Procedure Details  Location: elbow - R elbow (right tennis elbow )  Preparation: Patient was prepped and draped in the usual sterile fashion  Needle size: 25 G  Medications administered: 80 mg methylPREDNISolone acetate 80 MG/ML; 1 mL lidocaine PF 2% 2 %  Patient tolerance: patient tolerated the procedure well with no immediate complications        CC to Callie Carmona PA-C

## 2018-11-28 ENCOUNTER — OFFICE VISIT (OUTPATIENT)
Dept: RETAIL CLINIC | Facility: CLINIC | Age: 68
End: 2018-11-28

## 2018-11-28 VITALS
SYSTOLIC BLOOD PRESSURE: 100 MMHG | HEART RATE: 68 BPM | OXYGEN SATURATION: 98 % | RESPIRATION RATE: 18 BRPM | DIASTOLIC BLOOD PRESSURE: 70 MMHG

## 2018-11-28 DIAGNOSIS — N30.91 CYSTITIS WITH HEMATURIA: Primary | ICD-10-CM

## 2018-11-28 LAB
BILIRUB BLD-MCNC: NEGATIVE MG/DL
CLARITY, POC: ABNORMAL
COLOR UR: ABNORMAL
GLUCOSE UR STRIP-MCNC: NEGATIVE MG/DL
KETONES UR QL: NEGATIVE
LEUKOCYTE EST, POC: ABNORMAL
NITRITE UR-MCNC: NEGATIVE MG/ML
PH UR: 6 [PH] (ref 5–8)
PROT UR STRIP-MCNC: ABNORMAL MG/DL
RBC # UR STRIP: ABNORMAL /UL
SP GR UR: 1.02 (ref 1–1.03)
UROBILINOGEN UR QL: NORMAL

## 2018-11-28 PROCEDURE — 99213 OFFICE O/P EST LOW 20 MIN: CPT | Performed by: NURSE PRACTITIONER

## 2018-11-28 PROCEDURE — 81003 URINALYSIS AUTO W/O SCOPE: CPT | Performed by: NURSE PRACTITIONER

## 2018-11-28 RX ORDER — LIDOCAINE HYDROCHLORIDE 20 MG/ML
1 INJECTION, SOLUTION EPIDURAL; INFILTRATION; INTRACAUDAL; PERINEURAL
Status: COMPLETED | OUTPATIENT
Start: 2018-11-26 | End: 2018-11-26

## 2018-11-28 RX ORDER — PHENAZOPYRIDINE HYDROCHLORIDE 200 MG/1
200 TABLET, FILM COATED ORAL 3 TIMES DAILY PRN
Qty: 6 TABLET | Refills: 0 | Status: SHIPPED | OUTPATIENT
Start: 2018-11-28 | End: 2018-11-30

## 2018-11-28 RX ORDER — CIPROFLOXACIN 500 MG/1
500 TABLET, FILM COATED ORAL EVERY 12 HOURS
Qty: 20 TABLET | Refills: 0 | Status: SHIPPED | OUTPATIENT
Start: 2018-11-28 | End: 2018-12-08

## 2018-11-28 RX ORDER — METHYLPREDNISOLONE ACETATE 80 MG/ML
80 INJECTION, SUSPENSION INTRA-ARTICULAR; INTRALESIONAL; INTRAMUSCULAR; SOFT TISSUE
Status: COMPLETED | OUTPATIENT
Start: 2018-11-26 | End: 2018-11-26

## 2018-11-28 NOTE — PROGRESS NOTES
Subjective   Patient ID: Carin Rodriguez is a 68 y.o. female presents with   Chief Complaint   Patient presents with   • Urinary Tract Infection       Urinary Tract Infection    This is a new problem. The current episode started today. The problem has been rapidly worsening. The quality of the pain is described as aching and burning. The pain is at a severity of 9/10. There has been no fever. She is not sexually active. There is no history of pyelonephritis. Associated symptoms include frequency, hematuria and urgency. Pertinent negatives include no chills, discharge, flank pain, nausea, sweats or vomiting. She has tried antibiotics (cipro today) for the symptoms. The treatment provided no relief. Her past medical history is significant for recurrent UTIs. There is no history of catheterization, kidney stones or a single kidney.   Patient reports that she had an UTI 11/7/18 and her provider prescribed 5 days worth of Cipro that she completed. Reports that once provider received culture results she called in 5 more days worth of Cipro and told patient to take it if she needed more. Reports that she did not fill it because she was feeling better but woke up today voiding blood. Patient reports that she was hurting so bad and  Blood is in her urine so she filled the previous prescription this morning and took 500 mg of Cipro at 08:30am today and came straight here.     Allergies   Allergen Reactions   • Clindamycin/Lincomycin Diarrhea       The following portions of the patient's history were reviewed and updated as appropriate: allergies, current medications, past family history, past medical history, past social history, past surgical history and problem list.      Review of Systems   Constitutional: Negative for chills.   Gastrointestinal: Negative for nausea and vomiting.   Genitourinary: Positive for frequency, hematuria and urgency. Negative for flank pain.       Objective     Vitals:    11/28/18 0924   BP:  100/70   Pulse: 68   Resp: 18   SpO2: 98%         Physical Exam   Constitutional: She is oriented to person, place, and time. She appears well-developed and well-nourished. She does not appear ill. No distress.   HENT:   Head: Normocephalic.   Right Ear: Hearing and external ear normal.   Left Ear: Hearing and external ear normal.   Eyes: Conjunctivae are normal.   Sclera white.   Neck: No tracheal deviation present.   Cardiovascular: Normal rate, regular rhythm, S1 normal, S2 normal and normal heart sounds.   Pulmonary/Chest: Effort normal and breath sounds normal. No accessory muscle usage. No respiratory distress.   Abdominal: Soft. Bowel sounds are normal. She exhibits no distension. There is no hepatosplenomegaly. There is tenderness in the suprapubic area. There is no rigidity, no rebound, no guarding and no CVA tenderness. No hernia.   Lymphadenopathy:     She has no cervical adenopathy.   Neurological: She is alert and oriented to person, place, and time.   Skin: Skin is warm and dry.   Vitals reviewed.    Ref Range & Units 10:11    Color Yellow, Straw, Dark Yellow, Doretha Red Abnormal     Clarity, UA Clear Cloudy Abnormal     Specific Gravity  1.005 - 1.030 1.020    pH, Urine 5.0 - 8.0 6.0    Leukocytes Negative Large (3+) Abnormal     Nitrite, UA Negative Negative    Protein, POC Negative mg/dL 2+ Abnormal     Glucose, UA Negative, 1000 mg/dL (3+) mg/dL Negative    Ketones, UA Negative Negative    Urobilinogen, UA Normal Normal    Bilirubin Negative Negative    Blood, UA Negative 3+ Abnormal     Resulting Agency  [facility]         Carin was seen today for urinary tract infection.    Diagnoses and all orders for this visit:    Cystitis with hematuria  -     ciprofloxacin (CIPRO) 500 MG tablet; Take 1 tablet by mouth Every 12 (Twelve) Hours for 10 days.  -     phenazopyridine (PYRIDIUM) 200 MG tablet; Take 1 tablet by mouth 3 (Three) Times a Day As Needed for bladder spasms for up  to 2 days.  -     Urine Culture, Comprehen ([facility]) - Urine, Clean Catch  -     POC Urinalysis Dipstick, Automated        Patient understands possible side effects of all medications ordered. Follow-up with Primary Care Physician in 48-72 hours if these symptoms worsen or fail to improve as anticipated. Patient verbalizes understanding.    Urinary Tract Infection, Adult  A urinary tract infection (UTI) is an infection of any part of the urinary tract, which includes the kidneys, ureters, bladder, and urethra. These organs make, store, and get rid of urine in the body. UTI can be a bladder infection (cystitis) or kidney infection (pyelonephritis).  What are the causes?  This infection may be caused by fungi, viruses, or bacteria. Bacteria are the most common cause of UTIs. This condition can also be caused by repeated incomplete emptying of the bladder during urination.  What increases the risk?  This condition is more likely to develop if:  · You ignore your need to urinate or hold urine for long periods of time.  · You do not empty your bladder completely during urination.  · You wipe back to front after urinating or having a bowel movement, if you are female.  · You are uncircumcised, if you are male.  · You are constipated.  · You have a urinary catheter that stays in place (indwelling).  · You have a weak defense (immune) system.  · You have a medical condition that affects your bowels, kidneys, or bladder.  · You have diabetes.  · You take antibiotic medicines frequently or for long periods of time, and the antibiotics no longer work well against certain types of infections (antibiotic resistance).  · You take medicines that irritate your urinary tract.  · You are exposed to chemicals that irritate your urinary tract.  · You are female.    What are the signs or symptoms?  Symptoms of this condition include:  · Fever.  · Frequent urination or passing small amounts of urine frequently.  · Needing to urinate  urgently.  · Pain or burning with urination.  · Urine that smells bad or unusual.  · Cloudy urine.  · Pain in the lower abdomen or back.  · Trouble urinating.  · Blood in the urine.  · Vomiting or being less hungry than normal.  · Diarrhea or abdominal pain.  · Vaginal discharge, if you are female.    How is this diagnosed?  This condition is diagnosed with a medical history and physical exam. You will also need to provide a urine sample to test your urine. Other tests may be done, including:  · Blood tests.  · Sexually transmitted disease (STD) testing.    If you have had more than one UTI, a cystoscopy or imaging studies may be done to determine the cause of the infections.  How is this treated?  Treatment for this condition often includes a combination of two or more of the following:  · Antibiotic medicine.  · Other medicines to treat less common causes of UTI.  · Over-the-counter medicines to treat pain.  · Drinking enough water to stay hydrated.    Follow these instructions at home:  · Take over-the-counter and prescription medicines only as told by your health care provider.  · If you were prescribed an antibiotic, take it as told by your health care provider. Do not stop taking the antibiotic even if you start to feel better.  · Avoid alcohol, caffeine, tea, and carbonated beverages. They can irritate your bladder.  · Drink enough fluid to keep your urine clear or pale yellow.  · Keep all follow-up visits as told by your health care provider. This is important.  · Make sure to:  ? Empty your bladder often and completely. Do not hold urine for long periods of time.  ? Empty your bladder before and after sex.  ? Wipe from front to back after a bowel movement if you are female. Use each tissue one time when you wipe.  Contact a health care provider if:  · You have back pain.  · You have a fever.  · You feel nauseous or vomit.  · Your symptoms do not get better after 3 days.  · Your symptoms go away and then  return.  Get help right away if:  · You have severe back pain or lower abdominal pain.  · You are vomiting and cannot keep down any medicines or water.  This information is not intended to replace advice given to you by your health care provider. Make sure you discuss any questions you have with your health care provider.  Document Released: 09/27/2006 Document Revised: 05/31/2017 Document Reviewed: 11/07/2016  [employer] Interactive Patient Education © 2018 [employer]

## 2018-11-29 ENCOUNTER — RESULTS ENCOUNTER (OUTPATIENT)
Dept: ORTHOPEDIC SURGERY | Facility: CLINIC | Age: 68
End: 2018-11-29

## 2018-11-29 DIAGNOSIS — M25.50 ARTHRALGIA, UNSPECIFIED JOINT: ICD-10-CM

## 2018-12-03 ENCOUNTER — TELEPHONE (OUTPATIENT)
Dept: RETAIL CLINIC | Facility: CLINIC | Age: 68
End: 2018-12-03

## 2018-12-03 ENCOUNTER — TELEPHONE (OUTPATIENT)
Dept: OBSTETRICS AND GYNECOLOGY | Age: 68
End: 2018-12-03

## 2018-12-03 NOTE — TELEPHONE ENCOUNTER
Dr Carrasquillo pt states she has had reoccurring UTI and had blood in her urine, was treated at [facility] 11/30/18 & wants to know if she should f/u possibly with antibiotics from Dr Carrasquillo, PCP or need a referral for Urologist.

## 2018-12-03 NOTE — TELEPHONE ENCOUNTER
Did they not give her an abx? If not, yes she needs one.  I would recommend she follow up with PCP bc her insurance sees me as a specialist and will not let me refer.

## 2018-12-04 ENCOUNTER — TELEPHONE (OUTPATIENT)
Dept: NEUROSURGERY | Facility: CLINIC | Age: 68
End: 2018-12-04

## 2018-12-04 NOTE — TELEPHONE ENCOUNTER
Called regarding new patient packet that was mailed 10/23/2018. We have not received the packet. We will need it by noon tomorrow 12/5/2018 for her to keep the appointment on 12/6/2018. I had to LVM to call the office.

## 2018-12-05 ENCOUNTER — LAB (OUTPATIENT)
Dept: LAB | Facility: HOSPITAL | Age: 68
End: 2018-12-05

## 2018-12-05 DIAGNOSIS — M25.50 ARTHRALGIA, UNSPECIFIED JOINT: ICD-10-CM

## 2018-12-05 LAB
CHROMATIN AB SERPL-ACNC: <10 IU/ML (ref 0–14)
CRP SERPL-MCNC: 0.17 MG/DL (ref 0–0.5)
ERYTHROCYTE [SEDIMENTATION RATE] IN BLOOD: 8 MM/HR (ref 0–30)

## 2018-12-05 PROCEDURE — 86063 ANTISTREPTOLYSIN O SCREEN: CPT | Performed by: ORTHOPAEDIC SURGERY

## 2018-12-05 PROCEDURE — 86431 RHEUMATOID FACTOR QUANT: CPT

## 2018-12-05 PROCEDURE — 36415 COLL VENOUS BLD VENIPUNCTURE: CPT | Performed by: ORTHOPAEDIC SURGERY

## 2018-12-05 PROCEDURE — 85652 RBC SED RATE AUTOMATED: CPT

## 2018-12-05 PROCEDURE — 86038 ANTINUCLEAR ANTIBODIES: CPT

## 2018-12-05 PROCEDURE — 86140 C-REACTIVE PROTEIN: CPT

## 2018-12-05 PROCEDURE — 86747 PARVOVIRUS ANTIBODY: CPT

## 2018-12-06 LAB
ANA SER QL: NEGATIVE
ASO AB SERPL-ACNC: NEGATIVE [IU]/ML

## 2018-12-07 ENCOUNTER — TELEPHONE (OUTPATIENT)
Dept: ORTHOPEDIC SURGERY | Facility: CLINIC | Age: 68
End: 2018-12-07

## 2018-12-07 ENCOUNTER — TELEPHONE (OUTPATIENT)
Dept: RETAIL CLINIC | Facility: CLINIC | Age: 68
End: 2018-12-07

## 2018-12-07 LAB — B19V IGM SER IA-ACNC: 0.1 INDEX (ref 0–0.8)

## 2018-12-07 NOTE — TELEPHONE ENCOUNTER
Left VM to return call on both numbers. Needs letter mailed once printer comes back up due to 3 attempts and patient not returning call.

## 2018-12-07 NOTE — TELEPHONE ENCOUNTER
I spoke to Ms. Jennifer.  I provided her with the results of her lab work.  I recommended she follow up with her PCP for further evaluation. She said she has an appointment on Monday.  She reports her elbow is much improved after the injection by Dr. Mitchell.

## 2018-12-10 ENCOUNTER — OFFICE VISIT (OUTPATIENT)
Dept: FAMILY MEDICINE CLINIC | Facility: CLINIC | Age: 68
End: 2018-12-10

## 2018-12-10 VITALS
SYSTOLIC BLOOD PRESSURE: 110 MMHG | DIASTOLIC BLOOD PRESSURE: 72 MMHG | OXYGEN SATURATION: 95 % | HEIGHT: 65 IN | TEMPERATURE: 98.1 F | HEART RATE: 65 BPM | WEIGHT: 131 LBS | BODY MASS INDEX: 21.83 KG/M2 | RESPIRATION RATE: 16 BRPM

## 2018-12-10 DIAGNOSIS — N39.0 RECURRENT UTI: ICD-10-CM

## 2018-12-10 DIAGNOSIS — E78.2 MIXED HYPERLIPIDEMIA: ICD-10-CM

## 2018-12-10 DIAGNOSIS — R73.01 IMPAIRED FASTING GLUCOSE: ICD-10-CM

## 2018-12-10 DIAGNOSIS — E55.9 VITAMIN D DEFICIENCY: ICD-10-CM

## 2018-12-10 PROCEDURE — 99213 OFFICE O/P EST LOW 20 MIN: CPT | Performed by: PHYSICIAN ASSISTANT

## 2018-12-10 RX ORDER — CIPROFLOXACIN 250 MG/1
TABLET, FILM COATED ORAL
Refills: 1 | COMMUNITY
Start: 2018-11-28 | End: 2018-12-10

## 2018-12-10 NOTE — PROGRESS NOTES
Subjective   Carin Rodriguez is a 68 y.o. female.     History of Present Illness   Carin Rodriguez 68 y.o. female who presents today for recurrent UTI and sees Dr Carrasquillo and has her on Rx for atrophy.  Having multiple UTIs per year and seems to keep this going.  I will refer urologist.  Also see ortho did labs for joint pain and all negative.  I will go ahead and update her routine labs and released them.  she has a history of   Patient Active Problem List   Diagnosis   • Impaired fasting glucose   • Recurrent major depressive disorder, in full remission (CMS/HCC)   • Hyperlipidemia   • Thyroid goiter   • Vaginal atrophy   • Osteopenia   • MVP (mitral valve prolapse)   .      Takes NSAID OTC PRN      Echo due 2020    No current UTI C/o    The following portions of the patient's history were reviewed and updated as appropriate: allergies, current medications, past family history, past medical history, past social history, past surgical history and problem list.    Review of Systems   Constitutional: Negative for activity change, appetite change and unexpected weight change.   HENT: Negative for nosebleeds and trouble swallowing.    Eyes: Negative for pain and visual disturbance.   Respiratory: Negative for chest tightness, shortness of breath and wheezing.    Cardiovascular: Negative for chest pain and palpitations.   Gastrointestinal: Negative for abdominal pain and blood in stool.   Endocrine: Negative.    Genitourinary: Negative for difficulty urinating and hematuria.   Musculoskeletal: Positive for arthralgias. Negative for joint swelling.   Skin: Negative for color change and rash.   Allergic/Immunologic: Negative.    Neurological: Negative for syncope and speech difficulty.   Hematological: Negative for adenopathy.   Psychiatric/Behavioral: Negative for agitation and confusion.   All other systems reviewed and are negative.      Objective   Physical Exam   Constitutional: She is oriented to person, place, and  time. She appears well-developed and well-nourished. No distress.   HENT:   Head: Normocephalic and atraumatic.   Eyes: Conjunctivae and EOM are normal. Pupils are equal, round, and reactive to light. Right eye exhibits no discharge. Left eye exhibits no discharge. No scleral icterus.   Neck: Normal range of motion. Neck supple. No tracheal deviation present. No thyromegaly present.   Cardiovascular: Normal rate, regular rhythm, normal heart sounds, intact distal pulses and normal pulses. Exam reveals no gallop.   No murmur heard.  Pulmonary/Chest: Effort normal and breath sounds normal. No respiratory distress. She has no wheezes. She has no rales.   Musculoskeletal: Normal range of motion.   Neurological: She is alert and oriented to person, place, and time. She exhibits normal muscle tone. Coordination normal.   Skin: Skin is warm. No rash noted. No erythema. No pallor.   Psychiatric: She has a normal mood and affect. Her behavior is normal. Judgment and thought content normal.   Nursing note and vitals reviewed.      Assessment/Plan   Carin was seen today for urinary tract infection and joint pain.    Diagnoses and all orders for this visit:    Recurrent UTI  -     Vitamin D 25 Hydroxy  -     Folate  -     Vitamin B12  -     T4, Free  -     T3, Free  -     Hemoglobin A1c  -     TSH  -     CBC and Differential  -     Lipid panel  -     Comprehensive metabolic panel    Impaired fasting glucose  -     Vitamin D 25 Hydroxy  -     Folate  -     Vitamin B12  -     T4, Free  -     T3, Free  -     Hemoglobin A1c  -     TSH  -     CBC and Differential  -     Lipid panel  -     Comprehensive metabolic panel    Mixed hyperlipidemia  -     Vitamin D 25 Hydroxy  -     Folate  -     Vitamin B12  -     T4, Free  -     T3, Free  -     Hemoglobin A1c  -     TSH  -     CBC and Differential  -     Lipid panel  -     Comprehensive metabolic panel    Vitamin D deficiency  -     Vitamin D 25 Hydroxy  -     Folate  -     Vitamin  B12  -     T4, Free  -     T3, Free  -     Hemoglobin A1c  -     TSH  -     CBC and Differential  -     Lipid panel  -     Comprehensive metabolic panel

## 2018-12-22 LAB
25(OH)D3+25(OH)D2 SERPL-MCNC: 55.6 NG/ML (ref 30–100)
ALBUMIN SERPL-MCNC: 5.6 G/DL (ref 3.5–5.2)
ALBUMIN/GLOB SERPL: 3.7 G/DL
ALP SERPL-CCNC: 67 U/L (ref 39–117)
ALT SERPL-CCNC: 17 U/L (ref 1–33)
AST SERPL-CCNC: 18 U/L (ref 1–32)
BASOPHILS # BLD AUTO: 0.04 10*3/MM3 (ref 0–0.2)
BASOPHILS NFR BLD AUTO: 0.9 % (ref 0–1.5)
BILIRUB SERPL-MCNC: 0.3 MG/DL (ref 0.1–1.2)
BUN SERPL-MCNC: 21 MG/DL (ref 8–23)
BUN/CREAT SERPL: 30 (ref 7–25)
CALCIUM SERPL-MCNC: 9.6 MG/DL (ref 8.6–10.5)
CHLORIDE SERPL-SCNC: 101 MMOL/L (ref 98–107)
CHOLEST SERPL-MCNC: 177 MG/DL (ref 0–200)
CO2 SERPL-SCNC: 30.5 MMOL/L (ref 22–29)
CREAT SERPL-MCNC: 0.7 MG/DL (ref 0.57–1)
EOSINOPHIL # BLD AUTO: 0.17 10*3/MM3 (ref 0–0.7)
EOSINOPHIL NFR BLD AUTO: 3.9 % (ref 0.3–6.2)
ERYTHROCYTE [DISTWIDTH] IN BLOOD BY AUTOMATED COUNT: 14.1 % (ref 11.7–13)
FOLATE SERPL-MCNC: >20 NG/ML (ref 4.78–24.2)
GLOBULIN SER CALC-MCNC: 1.5 GM/DL
GLUCOSE SERPL-MCNC: 106 MG/DL (ref 65–99)
HBA1C MFR BLD: 5.85 % (ref 4.8–5.6)
HCT VFR BLD AUTO: 39 % (ref 35.6–45.5)
HDLC SERPL-MCNC: 68 MG/DL (ref 40–60)
HGB BLD-MCNC: 12.6 G/DL (ref 11.9–15.5)
IMM GRANULOCYTES # BLD: 0.01 10*3/MM3 (ref 0–0.03)
IMM GRANULOCYTES NFR BLD: 0.2 % (ref 0–0.5)
LDLC SERPL CALC-MCNC: 86 MG/DL (ref 0–100)
LYMPHOCYTES # BLD AUTO: 1.53 10*3/MM3 (ref 0.9–4.8)
LYMPHOCYTES NFR BLD AUTO: 34.9 % (ref 19.6–45.3)
MCH RBC QN AUTO: 29.3 PG (ref 26.9–32)
MCHC RBC AUTO-ENTMCNC: 32.3 G/DL (ref 32.4–36.3)
MCV RBC AUTO: 90.7 FL (ref 80.5–98.2)
MONOCYTES # BLD AUTO: 0.53 10*3/MM3 (ref 0.2–1.2)
MONOCYTES NFR BLD AUTO: 12.1 % (ref 5–12)
NEUTROPHILS # BLD AUTO: 2.12 10*3/MM3 (ref 1.9–8.1)
NEUTROPHILS NFR BLD AUTO: 48.2 % (ref 42.7–76)
PLATELET # BLD AUTO: 190 10*3/MM3 (ref 140–500)
POTASSIUM SERPL-SCNC: 4.7 MMOL/L (ref 3.5–5.2)
PROT SERPL-MCNC: 7.1 G/DL (ref 6–8.5)
RBC # BLD AUTO: 4.3 10*6/MM3 (ref 3.9–5.2)
SODIUM SERPL-SCNC: 142 MMOL/L (ref 136–145)
T3FREE SERPL-MCNC: 2.5 PG/ML (ref 2–4.4)
T4 FREE SERPL-MCNC: 1.11 NG/DL (ref 0.93–1.7)
TRIGL SERPL-MCNC: 116 MG/DL (ref 0–150)
TSH SERPL DL<=0.005 MIU/L-ACNC: 2.6 MIU/ML (ref 0.27–4.2)
VIT B12 SERPL-MCNC: 1164 PG/ML (ref 211–946)
VLDLC SERPL CALC-MCNC: 23.2 MG/DL (ref 5–40)
WBC # BLD AUTO: 4.39 10*3/MM3 (ref 4.5–10.7)

## 2019-01-04 NOTE — PROGRESS NOTES
Subjective   Patient ID: Carin Rodriguez is a 68 y.o. female is being seen for consultation today at the request of Lacy العلي,* for low back and hip pain. Patient states that also has bilateral knee pain and elbow pain.  She has had lumbar PT in past which helped.    History of Present Illness    This patient has been having pain in her back with radiation into her hips but not down her legs.  She also has pain in both of her knees and has recently even had an injection in her right elbow for pain.  She has had pain for a long time and it is gradually become worse.  She has no difficulty with bowel bladder control or other associated symptoms.  The pain is worse with activity but it does come and go and sometimes she doesn't have the pain particularly in her back.    The following portions of the patient's history were reviewed and updated as appropriate: allergies, current medications, past family history, past medical history, past social history, past surgical history and problem list.    Review of Systems   Constitutional: Positive for activity change. Negative for fever.   Respiratory: Negative for chest tightness and shortness of breath.    Cardiovascular: Negative for chest pain.   Gastrointestinal: Negative for abdominal pain.   Genitourinary: Positive for pelvic pain. Negative for dysuria.   Musculoskeletal: Positive for arthralgias ( Hip pain), back pain and neck pain.   Neurological: Negative for numbness and headaches.   All other systems reviewed and are negative.      Objective   Physical Exam   Constitutional: She is oriented to person, place, and time. She appears well-developed and well-nourished.   HENT:   Head: Normocephalic and atraumatic.   Eyes: Conjunctivae and EOM are normal. Pupils are equal, round, and reactive to light.   Fundoscopic exam:       The right eye shows no papilledema. The right eye shows venous pulsations.        The left eye shows no papilledema. The left eye  shows venous pulsations.   Neck: Carotid bruit is not present.   Neurological: She is oriented to person, place, and time. She has a normal Finger-Nose-Finger Test and a normal Heel to Shin Test. Gait normal.   Reflex Scores:       Tricep reflexes are 2+ on the right side and 2+ on the left side.       Bicep reflexes are 2+ on the right side and 2+ on the left side.       Brachioradialis reflexes are 2+ on the right side and 2+ on the left side.       Patellar reflexes are 2+ on the right side and 2+ on the left side.       Achilles reflexes are 2+ on the right side and 2+ on the left side.  Psychiatric: Her speech is normal.     Neurologic Exam     Mental Status   Oriented to person, place, and time.   Registration of memory: Good recent and remote memory.   Attention: normal. Concentration: normal.   Speech: speech is normal   Level of consciousness: alert  Knowledge: consistent with education.     Cranial Nerves     CN II   Visual fields full to confrontation.   Visual acuity: normal    CN III, IV, VI   Pupils are equal, round, and reactive to light.  Extraocular motions are normal.     CN V   Facial sensation intact.   Right corneal reflex: normal  Left corneal reflex: normal    CN VII   Facial expression full, symmetric.   Right facial weakness: none  Left facial weakness: none    CN VIII   Hearing: intact    CN IX, X   Palate: symmetric    CN XI   Right sternocleidomastoid strength: normal  Left sternocleidomastoid strength: normal    CN XII   Tongue: not atrophic  Tongue deviation: none    Motor Exam   Muscle bulk: normal  Right arm tone: normal  Left arm tone: normal  Right leg tone: normal  Left leg tone: normal    Strength   Strength 5/5 except as noted.     Sensory Exam   Light touch normal.     Gait, Coordination, and Reflexes     Gait  Gait: normal    Coordination   Finger to nose coordination: normal  Heel to shin coordination: normal    Reflexes   Right brachioradialis: 2+  Left brachioradialis:  2+  Right biceps: 2+  Left biceps: 2+  Right triceps: 2+  Left triceps: 2+  Right patellar: 2+  Left patellar: 2+  Right achilles: 2+  Left achilles: 2+  Right : 2+  Left : 2+      Assessment/Plan   Independent Review of Radiographic Studies:      I reviewed an MRI of the lumbar spine done in October of this year.  This shows a widely patent canal and neural foramina at T12-L1.  L1 to shows some lateral recess stenosis and fairly severe degenerative disc disease.  L2-3 also shows some lateral recess stenosis as does L3 4 with also some central stenosis.  L4 5 is a little narrow as is L5-S1.    Medical Decision Making:      I told the patient about the imaging.  I told her that there is nothing here that is terribly dangerous and I certainly would not recommend doing any type of invasive procedure at this point.  I did briefly discuss pain management injections with her but I did not recommend them right now because her pain comes and goes.  I did suggest she consider some physical therapy but she would like to think about therapy at other areas as well and so we will give her a copy of the prescription for the spine therapy and then she can talk to her PCP about other areas.    Carin was seen today for back pain and hip pain.    Diagnoses and all orders for this visit:    Spinal stenosis of lumbar region without neurogenic claudication  -     Ambulatory Referral to Physical Therapy      No Follow-up on file.

## 2019-01-10 ENCOUNTER — OFFICE VISIT (OUTPATIENT)
Dept: NEUROSURGERY | Facility: CLINIC | Age: 69
End: 2019-01-10

## 2019-01-10 VITALS
DIASTOLIC BLOOD PRESSURE: 78 MMHG | HEART RATE: 68 BPM | SYSTOLIC BLOOD PRESSURE: 126 MMHG | BODY MASS INDEX: 22.49 KG/M2 | WEIGHT: 135 LBS | HEIGHT: 65 IN

## 2019-01-10 DIAGNOSIS — M48.061 SPINAL STENOSIS OF LUMBAR REGION WITHOUT NEUROGENIC CLAUDICATION: Primary | ICD-10-CM

## 2019-01-10 PROCEDURE — 99203 OFFICE O/P NEW LOW 30 MIN: CPT | Performed by: NEUROLOGICAL SURGERY

## 2019-01-15 ENCOUNTER — TELEPHONE (OUTPATIENT)
Dept: OBSTETRICS AND GYNECOLOGY | Age: 69
End: 2019-01-15

## 2019-01-15 NOTE — TELEPHONE ENCOUNTER
Dr Carrasquillo pt states she is exp vag itching, Dr Carrasquillo has recomm pt use hdrocotizone 2.5%, pt is requesting we send that to her pharm on file allergic to clindamycin. Please advise

## 2019-01-29 ENCOUNTER — TELEPHONE (OUTPATIENT)
Dept: FAMILY MEDICINE CLINIC | Facility: CLINIC | Age: 69
End: 2019-01-29

## 2019-01-29 NOTE — TELEPHONE ENCOUNTER
Pt is having vaginal itching. She was told to use hdrocotizone 2.5% from a doctor. She thinks it was you that told her. But she would like to have a RX for this.

## 2019-03-07 ENCOUNTER — OFFICE VISIT (OUTPATIENT)
Dept: FAMILY MEDICINE CLINIC | Facility: CLINIC | Age: 69
End: 2019-03-07

## 2019-03-07 VITALS
SYSTOLIC BLOOD PRESSURE: 110 MMHG | HEART RATE: 69 BPM | OXYGEN SATURATION: 99 % | BODY MASS INDEX: 22.49 KG/M2 | WEIGHT: 135 LBS | HEIGHT: 65 IN | DIASTOLIC BLOOD PRESSURE: 62 MMHG | RESPIRATION RATE: 16 BRPM | TEMPERATURE: 97.9 F

## 2019-03-07 DIAGNOSIS — K21.00 GASTROESOPHAGEAL REFLUX DISEASE WITH ESOPHAGITIS: Primary | ICD-10-CM

## 2019-03-07 PROCEDURE — 99213 OFFICE O/P EST LOW 20 MIN: CPT | Performed by: PHYSICIAN ASSISTANT

## 2019-03-07 RX ORDER — ESOMEPRAZOLE MAGNESIUM 40 MG/1
40 CAPSULE, DELAYED RELEASE ORAL DAILY
Qty: 30 CAPSULE | Refills: 5 | Status: SHIPPED | OUTPATIENT
Start: 2019-03-07 | End: 2019-04-04 | Stop reason: SDUPTHER

## 2019-03-07 NOTE — PATIENT INSTRUCTIONS
Heartburn  Heartburn is a type of pain or discomfort that can happen in the throat or chest. It is often described as a burning pain. It may also cause a bad taste in the mouth. Heartburn may feel worse when you lie down or bend over, and it is often worse at night. Heartburn may be caused by stomach contents that move back up into the esophagus (reflux).  Follow these instructions at home:  Take these actions to decrease your discomfort and to help avoid complications.  Diet  · Follow a diet as recommended by your health care provider. This may involve avoiding foods and drinks such as:  ? Coffee and tea (with or without caffeine).  ? Drinks that contain alcohol.  ? Energy drinks and sports drinks.  ? Carbonated drinks or sodas.  ? Chocolate and cocoa.  ? Peppermint and mint flavorings.  ? Garlic and onions.  ? Horseradish.  ? Spicy and acidic foods, including peppers, chili powder, ramirez powder, vinegar, hot sauces, and barbecue sauce.  ? Citrus fruit juices and citrus fruits, such as oranges, saima, and limes.  ? Tomato-based foods, such as red sauce, chili, salsa, and pizza with red sauce.  ? Fried and fatty foods, such as donuts, french fries, potato chips, and high-fat dressings.  ? High-fat meats, such as hot dogs and fatty cuts of red and white meats, such as rib eye steak, sausage, ham, and medellin.  ? High-fat dairy items, such as whole milk, butter, and cream cheese.  · Eat small, frequent meals instead of large meals.  · Avoid drinking large amounts of liquid with your meals.  · Avoid eating meals during the 2-3 hours before bedtime.  · Avoid lying down right after you eat.  · Do not exercise right after you eat.  General instructions  · Pay attention to any changes in your symptoms.  · Take over-the-counter and prescription medicines only as told by your health care provider. Do not take aspirin, ibuprofen, or other NSAIDs unless your health care provider told you to do so.  · Do not use any tobacco  products, including cigarettes, chewing tobacco, and e-cigarettes. If you need help quitting, ask your health care provider.  · Wear loose-fitting clothing. Do not wear anything tight around your waist that causes pressure on your abdomen.  · Raise (elevate) the head of your bed about 6 inches (15 cm).  · Try to reduce your stress, such as with yoga or meditation. If you need help reducing stress, ask your health care provider.  · If you are overweight, reduce your weight to an amount that is healthy for you. Ask your health care provider for guidance about a safe weight loss goal.  · Keep all follow-up visits as told by your health care provider. This is important.  Contact a health care provider if:  · You have new symptoms.  · You have unexplained weight loss.  · You have difficulty swallowing, or it hurts to swallow.  · You have wheezing or a persistent cough.  · Your symptoms do not improve with treatment.  · You have frequent heartburn for more than two weeks.  Get help right away if:  · You have pain in your arms, neck, jaw, teeth, or back.  · You feel sweaty, dizzy, or light-headed.  · You have chest pain or shortness of breath.  · You vomit and your vomit looks like blood or coffee grounds.  · Your stool is bloody or black.  This information is not intended to replace advice given to you by your health care provider. Make sure you discuss any questions you have with your health care provider.  Document Released: 05/06/2010 Document Revised: 05/25/2017 Document Reviewed: 04/13/2016  [employer] Interactive Patient Education © 2018 [employer]  Food Choices for Gastroesophageal Reflux Disease, Adult  When you have gastroesophageal reflux disease (GERD), the foods you eat and your eating habits are very important. Choosing the right foods can help ease the discomfort of GERD. Consider working with a diet and nutrition specialist (dietitian) to help you make healthy food choices.  What general guidelines should  I follow?  Eating plan  · Choose healthy foods low in fat, such as fruits, vegetables, whole grains, low-fat dairy products, and lean meat, fish, and poultry.  · Eat frequent, small meals instead of three large meals each day. Eat your meals slowly, in a relaxed setting. Avoid bending over or lying down until 2-3 hours after eating.  · Limit high-fat foods such as fatty meats or fried foods.  · Limit your intake of oils, butter, and shortening to less than 8 teaspoons each day.  · Avoid the following:  ? Foods that cause symptoms. These may be different for different people. Keep a food diary to keep track of foods that cause symptoms.  ? Alcohol.  ? Drinking large amounts of liquid with meals.  ? Eating meals during the 2-3 hours before bed.  · Cook foods using methods other than frying. This may include baking, grilling, or broiling.  Lifestyle    · Maintain a healthy weight. Ask your health care provider what weight is healthy for you. If you need to lose weight, work with your health care provider to do so safely.  · Exercise for at least 30 minutes on 5 or more days each week, or as told by your health care provider.  · Avoid wearing clothes that fit tightly around your waist and chest.  · Do not use any products that contain nicotine or tobacco, such as cigarettes and e-cigarettes. If you need help quitting, ask your health care provider.  · Sleep with the head of your bed raised. Use a wedge under the mattress or blocks under the bed frame to raise the head of the bed.  What foods are not recommended?  The items listed may not be a complete list. Talk with your dietitian about what dietary choices are best for you.  Grains  Pastries or quick breads with added fat. French toast.  Vegetables  Deep fried vegetables. French fries. Any vegetables prepared with added fat. Any vegetables that cause symptoms. For some people this may include tomatoes and tomato products, chili peppers, onions and garlic, and  horseradish.  Fruits  Any fruits prepared with added fat. Any fruits that cause symptoms. For some people this may include citrus fruits, such as oranges, grapefruit, pineapple, and saima.  Meats and other protein foods  High-fat meats, such as fatty beef or pork, hot dogs, ribs, ham, sausage, salami and medellin. Fried meat or protein, including fried fish and fried chicken. Nuts and nut butters.  Dairy  Whole milk and chocolate milk. Sour cream. Cream. Ice cream. Cream cheese. Milk shakes.  Beverages  Coffee and tea, with or without caffeine. Carbonated beverages. Sodas. Energy drinks. Fruit juice made with acidic fruits (such as orange or grapefruit). Tomato juice. Alcoholic drinks.  Fats and oils  Butter. Margarine. Shortening. Ghee.  Sweets and desserts  Chocolate and cocoa. Donuts.  Seasoning and other foods  Pepper. Peppermint and spearmint. Any condiments, herbs, or seasonings that cause symptoms. For some people, this may include ramirez, hot sauce, or vinegar-based salad dressings.  Summary  · When you have gastroesophageal reflux disease (GERD), food and lifestyle choices are very important to help ease the discomfort of GERD.  · Eat frequent, small meals instead of three large meals each day. Eat your meals slowly, in a relaxed setting. Avoid bending over or lying down until 2-3 hours after eating.  · Limit high-fat foods such as fatty meat or fried foods.  This information is not intended to replace advice given to you by your health care provider. Make sure you discuss any questions you have with your health care provider.  Document Released: 12/18/2006 Document Revised: 12/19/2017 Document Reviewed: 12/19/2017  [employer] Interactive Patient Education © 2018 [employer]

## 2019-03-07 NOTE — PROGRESS NOTES
Subjective   Carin Rodriguez is a 68 y.o. female.   Carin Rodriguez 68 y.o. female who presents for evaluation of GERD. Symptoms have been present for 4 days .  The condition is aggravated by eating any food, laying down after eating and liquids . she is experiencing nausea.  Alleviating factors are H2 blocker with some help, but still symptoms . Patient denies diarrhea, constipation, abdominal pain, vomiting and dysphagia her past medical history is notable for microscopic colitis.  Patient denies recent travel.    Does have nausea at times    Still C/o with H2 blocker  No abd pain    History of Present Illness   Lab Results   Component Value Date    BUN 21 12/21/2018    CREATININE 0.70 12/21/2018    EGFRIFNONA 84 12/21/2018    EGFRIFAFRI 101 12/21/2018    BCR 30.0 (H) 12/21/2018    K 4.7 12/21/2018    CO2 30.5 (H) 12/21/2018    CALCIUM 9.6 12/21/2018    PROTENTOTREF 7.1 12/21/2018    ALBUMIN 5.60 (H) 12/21/2018    LABIL2 3.7 12/21/2018    AST 18 12/21/2018    ALT 17 12/21/2018     Lab Results   Component Value Date    WBC 4.39 (L) 12/21/2018    HGB 12.6 12/21/2018    HCT 39.0 12/21/2018    MCV 90.7 12/21/2018     12/21/2018     Weight up 2 lbs  Can wake her up  Can be constant  Refer GI if no help    Has thyroid f/u appt DR Amos  The following portions of the patient's history were reviewed and updated as appropriate: allergies, current medications, past family history, past medical history, past social history, past surgical history and problem list.    Review of Systems   Constitutional: Negative for activity change, appetite change and unexpected weight change.   HENT: Negative for nosebleeds and trouble swallowing.    Eyes: Negative for pain and visual disturbance.   Respiratory: Negative for chest tightness, shortness of breath and wheezing.    Cardiovascular: Negative for chest pain and palpitations.   Gastrointestinal: Negative for abdominal pain and blood in stool.   Endocrine: Negative.     Genitourinary: Negative for difficulty urinating and hematuria.   Musculoskeletal: Negative for joint swelling.   Skin: Negative for color change and rash.   Allergic/Immunologic: Negative.    Neurological: Negative for syncope and speech difficulty.   Hematological: Negative for adenopathy.   Psychiatric/Behavioral: Negative for agitation and confusion.   All other systems reviewed and are negative.      Objective   Physical Exam   Constitutional: She is oriented to person, place, and time. She appears well-developed and well-nourished. No distress.   HENT:   Head: Normocephalic and atraumatic.   Eyes: Conjunctivae and EOM are normal. Pupils are equal, round, and reactive to light. Right eye exhibits no discharge. Left eye exhibits no discharge. No scleral icterus.   Neck: Normal range of motion. Neck supple. No tracheal deviation present. No thyromegaly present.   Cardiovascular: Normal rate, regular rhythm, normal heart sounds, intact distal pulses and normal pulses. Exam reveals no gallop.   No murmur heard.  Pulmonary/Chest: Effort normal and breath sounds normal. No respiratory distress. She has no wheezes. She has no rales.   Abdominal: Soft. Bowel sounds are normal. There is no tenderness.   Musculoskeletal: Normal range of motion.   Neurological: She is alert and oriented to person, place, and time. She exhibits normal muscle tone. Coordination normal.   Skin: Skin is warm. No rash noted. No erythema. No pallor.   Psychiatric: She has a normal mood and affect. Her behavior is normal. Judgment and thought content normal.   Nursing note and vitals reviewed.      Assessment/Plan   Carin was seen today for heartburn.    Diagnoses and all orders for this visit:    Gastroesophageal reflux disease with esophagitis      Elevate HOB  GERD diet

## 2019-03-09 RX ORDER — ATORVASTATIN CALCIUM 10 MG/1
TABLET, FILM COATED ORAL
Qty: 90 TABLET | Refills: 2 | Status: SHIPPED | OUTPATIENT
Start: 2019-03-09 | End: 2019-08-06 | Stop reason: SDUPTHER

## 2019-03-11 RX ORDER — METFORMIN HYDROCHLORIDE 500 MG/1
2000 TABLET, EXTENDED RELEASE ORAL
Qty: 120 TABLET | Refills: 0 | Status: SHIPPED | OUTPATIENT
Start: 2019-03-11 | End: 2019-04-24

## 2019-03-23 RX ORDER — PROPRANOLOL HCL 60 MG
CAPSULE, EXTENDED RELEASE 24HR ORAL
Qty: 90 CAPSULE | Refills: 0 | Status: SHIPPED | OUTPATIENT
Start: 2019-03-23 | End: 2019-06-19 | Stop reason: SDUPTHER

## 2019-03-27 ENCOUNTER — TELEPHONE (OUTPATIENT)
Dept: OBSTETRICS AND GYNECOLOGY | Age: 69
End: 2019-03-27

## 2019-03-27 RX ORDER — CLOTRIMAZOLE AND BETAMETHASONE DIPROPIONATE 10; .64 MG/G; MG/G
CREAM TOPICAL EVERY 12 HOURS SCHEDULED
Qty: 15 G | Refills: 0 | Status: SHIPPED | OUTPATIENT
Start: 2019-03-27 | End: 2019-06-20 | Stop reason: SDUPTHER

## 2019-03-27 NOTE — TELEPHONE ENCOUNTER
Dr Carrasquillo pt needs a refill of chlortrimasome (?)  betamethasome for vag itch. Please advise

## 2019-04-04 DIAGNOSIS — K21.00 GASTROESOPHAGEAL REFLUX DISEASE WITH ESOPHAGITIS: Primary | ICD-10-CM

## 2019-04-04 DIAGNOSIS — F33.42 RECURRENT MAJOR DEPRESSIVE DISORDER, IN FULL REMISSION (HCC): ICD-10-CM

## 2019-04-04 RX ORDER — ESOMEPRAZOLE MAGNESIUM 40 MG/1
CAPSULE, DELAYED RELEASE ORAL
Qty: 60 CAPSULE | Refills: 5 | Status: SHIPPED | OUTPATIENT
Start: 2019-04-04 | End: 2019-07-24

## 2019-04-18 ENCOUNTER — TELEPHONE (OUTPATIENT)
Dept: OBSTETRICS AND GYNECOLOGY | Age: 69
End: 2019-04-18

## 2019-04-18 NOTE — TELEPHONE ENCOUNTER
(For Tomorrow Dr Carrasquillo) Dr Carrasquillo pt will be in the area tomorrow and would like to  some samples of Intrarosa vaginal cream from the office. Pt asked if she could be called when the  Medication is at the . #[phone]

## 2019-04-24 ENCOUNTER — OFFICE VISIT (OUTPATIENT)
Dept: GASTROENTEROLOGY | Facility: CLINIC | Age: 69
End: 2019-04-24

## 2019-04-24 VITALS
WEIGHT: 131.2 LBS | TEMPERATURE: 98.1 F | SYSTOLIC BLOOD PRESSURE: 118 MMHG | BODY MASS INDEX: 21.86 KG/M2 | HEIGHT: 65 IN | DIASTOLIC BLOOD PRESSURE: 68 MMHG

## 2019-04-24 DIAGNOSIS — K21.9 GASTROESOPHAGEAL REFLUX DISEASE, ESOPHAGITIS PRESENCE NOT SPECIFIED: Primary | ICD-10-CM

## 2019-04-24 DIAGNOSIS — R10.10 PAIN OF UPPER ABDOMEN: ICD-10-CM

## 2019-04-24 PROCEDURE — 99214 OFFICE O/P EST MOD 30 MIN: CPT | Performed by: NURSE PRACTITIONER

## 2019-04-24 RX ORDER — FESOTERODINE FUMARATE 4 MG/1
TABLET, FILM COATED, EXTENDED RELEASE ORAL DAILY
Refills: 11 | COMMUNITY
Start: 2019-03-26 | End: 2019-08-06 | Stop reason: SDUPTHER

## 2019-04-24 NOTE — PROGRESS NOTES
Chief Complaint   Patient presents with   • Heartburn       Carin Rodriguez is a  68 y.o. female here for reflux.    HPI  69 yo established f presents today for reflux and upper abd pain x 3 months. She is a patient of Dr. Mcmanus. She was last seen in the office on 5/2018. She has hx IBS-D and admits lately that has been well controlled. But she reports for the past 3-4 months she has been having worsening reflux symptoms. She went to see her PCP 2 months ago and was started on Nexium 40 mg daily. She admits it has really helped. It has resolved all her reflux symptoms. But she has been reading about it and doesn't want to be on it long term if she can take something else due to the possible osteoporosis risk with lowering calcium and vitamin D. She has some Pepcid complete at home and would like to try it. She denies any dysphagia, reflux, abd pain, N&V, diarrhea, constipation, rectal bleeding or melena. She admits her appetite is good and her weight is stable. Her last EGD was done in 3/2016 and it was normal.     Past Medical History:   Diagnosis Date   • Allergic    • Depression    • Heart palpitations    • Hyperlipidemia    • Microscopic colitis    • MVP (mitral valve prolapse) 01/24/2018    repeat echo 1-2020   • Urinary tract infection        Past Surgical History:   Procedure Laterality Date   • BREAST BIOPSY      several on borh breast benign many years ago   • CATARACT EXTRACTION     • COLONOSCOPY  03/25/2016    due in 10 years   • TONSILLECTOMY     • UPPER GASTROINTESTINAL ENDOSCOPY  03/25/2016    normal per patient   • WISDOM TOOTH EXTRACTION         Scheduled Meds:    Continuous Infusions:  No current facility-administered medications for this visit.     PRN Meds:.    Allergies   Allergen Reactions   • Clindamycin/Lincomycin Diarrhea       Social History     Socioeconomic History   • Marital status:      Spouse name: Rivera   • Number of children: 0   • Years of education: 16   • Highest  education level: Not on file   Occupational History   • Occupation: retired   Social Needs   • Financial resource strain: Patient refused   • Food insecurity:     Worry: Patient refused     Inability: Patient refused   • Transportation needs:     Medical: Patient refused     Non-medical: Patient refused   Tobacco Use   • Smoking status: Never Smoker   • Smokeless tobacco: Never Used   Substance and Sexual Activity   • Alcohol use: Yes     Comment: social   • Drug use: No   • Sexual activity: Not Currently   Social History Narrative    LIVES WITH SPOUSE       Family History   Problem Relation Age of Onset   • Diabetes Brother    • Depression Brother    • Prostate cancer Father 60   • Breast cancer Neg Hx    • Ovarian cancer Neg Hx    • Uterine cancer Neg Hx    • Colon cancer Neg Hx    • Melanoma Neg Hx    • Deep vein thrombosis Neg Hx    • Pulmonary embolism Neg Hx        Review of Systems   Constitutional: Negative for appetite change, chills, diaphoresis, fatigue, fever and unexpected weight change.   HENT: Negative for nosebleeds, postnasal drip, sore throat, trouble swallowing and voice change.    Respiratory: Negative for cough, choking, chest tightness, shortness of breath and wheezing.    Cardiovascular: Negative for chest pain.   Gastrointestinal: Positive for abdominal distention. Negative for abdominal pain, anal bleeding, blood in stool, constipation, diarrhea, nausea, rectal pain and vomiting.   Endocrine: Negative for polydipsia, polyphagia and polyuria.   Musculoskeletal: Negative for gait problem.   Skin: Negative for rash and wound.   Allergic/Immunologic: Negative for food allergies.   Neurological: Negative for dizziness, speech difficulty and light-headedness.   Psychiatric/Behavioral: Negative for confusion, self-injury, sleep disturbance and suicidal ideas.       Vitals:    04/24/19 1049   BP: 118/68   Temp: 98.1 °F (36.7 °C)       Physical Exam   Constitutional: She is oriented to person, place,  and time. She appears well-developed and well-nourished. She does not appear ill. No distress.   HENT:   Head: Normocephalic.   Eyes: Pupils are equal, round, and reactive to light.   Cardiovascular: Normal rate, regular rhythm and normal heart sounds.   Pulmonary/Chest: Effort normal and breath sounds normal.   Abdominal: Soft. Bowel sounds are normal. She exhibits no distension and no mass. There is no hepatosplenomegaly. There is no tenderness. There is no rebound and no guarding. No hernia.   Musculoskeletal: Normal range of motion.   Neurological: She is alert and oriented to person, place, and time.   Skin: Skin is warm and dry.   Psychiatric: She has a normal mood and affect. Her speech is normal and behavior is normal. Judgment normal.       No images are attached to the encounter.    Assessment & Plan    1. Gastroesophageal reflux disease, esophagitis presence not specified    2. Pain of upper abdomen    GERD is well controlled with Nexium 40 mg daily from PCP but patient would like to trial Pepcid OTC and see if she can stay off the Nexium with its potential PPI side effects related to osteoporosis. Will have her trial Pepcid complete daily OTC and have office in 1-2 weeks with update. If that does not work patient will try Nexium 20 mg daily OTC after that. Discussion about GERD triggers and precautions. If she has to go back on the PPI recommend repeat EGD with Dr. Mcmanus for further evaluation. Patient agreeable to the plan. Follow up with me in 1 month. Call office with any issues.

## 2019-05-12 RX ORDER — SERTRALINE HYDROCHLORIDE 100 MG/1
TABLET, FILM COATED ORAL
Qty: 135 TABLET | Refills: 0 | Status: SHIPPED | OUTPATIENT
Start: 2019-05-12

## 2019-06-07 RX ORDER — CHOLESTYRAMINE 4 G/9G
1 POWDER, FOR SUSPENSION ORAL 2 TIMES DAILY WITH MEALS
Qty: 60 PACKET | Refills: 5 | Status: SHIPPED | OUTPATIENT
Start: 2019-06-07 | End: 2019-07-24 | Stop reason: ALTCHOICE

## 2019-06-10 ENCOUNTER — CLINICAL SUPPORT (OUTPATIENT)
Dept: FAMILY MEDICINE CLINIC | Facility: CLINIC | Age: 69
End: 2019-06-10

## 2019-06-10 DIAGNOSIS — Z23 IMMUNIZATION DUE: Primary | ICD-10-CM

## 2019-06-10 PROCEDURE — G0009 ADMIN PNEUMOCOCCAL VACCINE: HCPCS | Performed by: PHYSICIAN ASSISTANT

## 2019-06-10 PROCEDURE — 90732 PPSV23 VACC 2 YRS+ SUBQ/IM: CPT | Performed by: PHYSICIAN ASSISTANT

## 2019-06-19 RX ORDER — PROPRANOLOL HCL 60 MG
CAPSULE, EXTENDED RELEASE 24HR ORAL
Qty: 90 CAPSULE | Refills: 0 | Status: SHIPPED | OUTPATIENT
Start: 2019-06-19 | End: 2019-06-20

## 2019-06-20 RX ORDER — CLOTRIMAZOLE AND BETAMETHASONE DIPROPIONATE 10; .64 MG/G; MG/G
CREAM TOPICAL
Qty: 15 G | Refills: 0 | Status: SHIPPED | OUTPATIENT
Start: 2019-06-20 | End: 2019-12-26

## 2019-06-20 RX ORDER — PROPRANOLOL HCL 60 MG
CAPSULE, EXTENDED RELEASE 24HR ORAL
Qty: 90 CAPSULE | Refills: 0 | Status: SHIPPED | OUTPATIENT
Start: 2019-06-20 | End: 2019-08-06 | Stop reason: SDUPTHER

## 2019-07-03 ENCOUNTER — TELEPHONE (OUTPATIENT)
Dept: OBSTETRICS AND GYNECOLOGY | Age: 69
End: 2019-07-03

## 2019-07-03 NOTE — TELEPHONE ENCOUNTER
Patient called for Intrarosa samples to .  Stated she would be in the area today.  Advised would look for some and call her back.  Left message for patient to  one box-14 inserts.

## 2019-07-24 ENCOUNTER — OFFICE VISIT (OUTPATIENT)
Dept: GASTROENTEROLOGY | Facility: CLINIC | Age: 69
End: 2019-07-24

## 2019-07-24 VITALS
DIASTOLIC BLOOD PRESSURE: 62 MMHG | SYSTOLIC BLOOD PRESSURE: 98 MMHG | WEIGHT: 131.4 LBS | HEIGHT: 65 IN | BODY MASS INDEX: 21.89 KG/M2 | TEMPERATURE: 98.7 F

## 2019-07-24 DIAGNOSIS — K21.9 GASTROESOPHAGEAL REFLUX DISEASE, ESOPHAGITIS PRESENCE NOT SPECIFIED: ICD-10-CM

## 2019-07-24 DIAGNOSIS — K58.0 IRRITABLE BOWEL SYNDROME WITH DIARRHEA: Primary | ICD-10-CM

## 2019-07-24 PROCEDURE — 99214 OFFICE O/P EST MOD 30 MIN: CPT | Performed by: NURSE PRACTITIONER

## 2019-07-24 RX ORDER — MONTELUKAST SODIUM 4 MG/1
TABLET, CHEWABLE ORAL
Qty: 60 TABLET | Refills: 11 | Status: SHIPPED | OUTPATIENT
Start: 2019-07-24

## 2019-07-24 NOTE — PROGRESS NOTES
Chief Complaint   Patient presents with   • Heartburn       Carin Rodriguez is a  69 y.o. female here for a follow up visit for GERD.    HPI  68 yo f presents today for follow up visit for GERD and IBS. She is a patient of Dr. Mcmanus. She has hx GERD and is happy to report she is doing well off the Nexium and just taking PEPCID dailyPRN OTC now. She denies any break-through reflux. She also has hx IBS-D/microscopic colitis and admits she is having trouble dosing the Cholestyramine. She will either have diarrhea or constipation with it. She has been changing the dosing almost daily. She denies any dysphagia, reflux, abd pain, N&V, rectal bleeding or melena. Her last EGD and colonoscopy was done 3/2016. She will be due again for colonoscopy in 2026.       Past Medical History:   Diagnosis Date   • Allergic    • Depression    • Heart palpitations    • Hyperlipidemia    • Microscopic colitis    • MVP (mitral valve prolapse) 01/24/2018    repeat echo 1-2020   • Urinary tract infection        Past Surgical History:   Procedure Laterality Date   • BREAST BIOPSY      several on [facility] breast benign many years ago   • CATARACT EXTRACTION     • COLONOSCOPY  03/25/2016    due in 10 years   • TONSILLECTOMY     • UPPER GASTROINTESTINAL ENDOSCOPY  03/25/2016    normal per patient   • WISDOM TOOTH EXTRACTION         Scheduled Meds:    Continuous Infusions:  No current facility-administered medications for this visit.     PRN Meds:.    Allergies   Allergen Reactions   • Clindamycin/Lincomycin Diarrhea       Social History     Socioeconomic History   • Marital status:      Spouse name: Rivera   • Number of children: 0   • Years of education: 16   • Highest education level: Not on file   Occupational History   • Occupation: retired   Social Needs   • Financial resource strain: Patient refused   • Food insecurity:     Worry: Patient refused     Inability: Patient refused   • Transportation needs:     Medical: Patient refused      Non-medical: Patient refused   Tobacco Use   • Smoking status: Never Smoker   • Smokeless tobacco: Never Used   Substance and Sexual Activity   • Alcohol use: Yes     Comment: social   • Drug use: No   • Sexual activity: Not Currently   Social History Narrative    LIVES WITH SPOUSE       Family History   Problem Relation Age of Onset   • Diabetes Brother    • Depression Brother    • Prostate cancer Father 60   • Breast cancer Neg Hx    • Ovarian cancer Neg Hx    • Uterine cancer Neg Hx    • Colon cancer Neg Hx    • Melanoma Neg Hx    • Deep vein thrombosis Neg Hx    • Pulmonary embolism Neg Hx        Review of Systems   Constitutional: Negative for appetite change, chills, diaphoresis, fatigue, fever and unexpected weight change.   HENT: Negative for nosebleeds, postnasal drip, sore throat, trouble swallowing and voice change.    Respiratory: Negative for cough, choking, chest tightness, shortness of breath, wheezing and stridor.    Cardiovascular: Negative for chest pain, palpitations and leg swelling.   Gastrointestinal: Negative for abdominal distention, abdominal pain, anal bleeding, blood in stool, constipation, diarrhea, nausea, rectal pain and vomiting.   Endocrine: Negative for polydipsia, polyphagia and polyuria.   Musculoskeletal: Negative for gait problem.   Skin: Negative for rash and wound.   Allergic/Immunologic: Negative for food allergies.   Neurological: Negative for dizziness, speech difficulty and light-headedness.   Psychiatric/Behavioral: Negative for confusion, self-injury, sleep disturbance and suicidal ideas.       Vitals:    07/24/19 1049   BP: 98/62   Temp: 98.7 °F (37.1 °C)       Physical Exam   Constitutional: She is oriented to person, place, and time. She appears well-developed and well-nourished. She does not appear ill. No distress.   HENT:   Head: Normocephalic.   Eyes: Pupils are equal, round, and reactive to light.   Cardiovascular: Normal rate, regular rhythm and normal heart  sounds.   Pulmonary/Chest: Effort normal and breath sounds normal.   Abdominal: Soft. Bowel sounds are normal. She exhibits no distension and no mass. There is no hepatosplenomegaly. There is no tenderness. There is no rebound and no guarding. No hernia.   Musculoskeletal: Normal range of motion.   Neurological: She is alert and oriented to person, place, and time.   Skin: Skin is warm and dry.   Psychiatric: She has a normal mood and affect. Her speech is normal and behavior is normal. Judgment normal.       No images are attached to the encounter.    Assessment & Plan    1. Irritable bowel syndrome with diarrhea    2. Gastroesophageal reflux disease, esophagitis presence not specified    IBS-D is controlled with Cholestyramine but its hard for the patient to dose without her having either diarrhea or constipation. Will trial her on some Colestid and see how she does with it. GERD is well controlled off the PPI and just taking PEPCID OTC as needed. Continue GERD precautions. Next colonoscopy will be due 3/2026. Follow up with ESTEFANIA Aragon in 3 months. Call office in 1-2 weeks with update.

## 2019-07-26 DIAGNOSIS — R73.01 IMPAIRED FASTING GLUCOSE: ICD-10-CM

## 2019-07-26 DIAGNOSIS — E78.2 MIXED HYPERLIPIDEMIA: Primary | ICD-10-CM

## 2019-07-26 RX ORDER — METFORMIN HYDROCHLORIDE 500 MG/1
TABLET, EXTENDED RELEASE ORAL
Qty: 180 TABLET | Refills: 0 | Status: SHIPPED | OUTPATIENT
Start: 2019-07-26 | End: 2019-08-06 | Stop reason: SDUPTHER

## 2019-07-30 LAB
ALBUMIN SERPL-MCNC: 4.5 G/DL (ref 3.5–5.2)
ALBUMIN/GLOB SERPL: 1.7 G/DL
ALP SERPL-CCNC: 69 U/L (ref 39–117)
ALT SERPL-CCNC: 11 U/L (ref 1–33)
AST SERPL-CCNC: 15 U/L (ref 1–32)
BILIRUB SERPL-MCNC: 0.2 MG/DL (ref 0.2–1.2)
BUN SERPL-MCNC: 20 MG/DL (ref 8–23)
BUN/CREAT SERPL: 27.8 (ref 7–25)
CALCIUM SERPL-MCNC: 9.3 MG/DL (ref 8.6–10.5)
CHLORIDE SERPL-SCNC: 100 MMOL/L (ref 98–107)
CO2 SERPL-SCNC: 28 MMOL/L (ref 22–29)
CREAT SERPL-MCNC: 0.72 MG/DL (ref 0.57–1)
GLOBULIN SER CALC-MCNC: 2.7 GM/DL
GLUCOSE SERPL-MCNC: 83 MG/DL (ref 65–99)
HBA1C MFR BLD: 6 % (ref 4.8–5.6)
POTASSIUM SERPL-SCNC: 4.6 MMOL/L (ref 3.5–5.2)
PROT SERPL-MCNC: 7.2 G/DL (ref 6–8.5)
SODIUM SERPL-SCNC: 140 MMOL/L (ref 136–145)

## 2019-08-06 ENCOUNTER — OFFICE VISIT (OUTPATIENT)
Dept: FAMILY MEDICINE CLINIC | Facility: CLINIC | Age: 69
End: 2019-08-06

## 2019-08-06 VITALS
RESPIRATION RATE: 16 BRPM | TEMPERATURE: 98.1 F | SYSTOLIC BLOOD PRESSURE: 104 MMHG | HEART RATE: 70 BPM | WEIGHT: 131 LBS | DIASTOLIC BLOOD PRESSURE: 62 MMHG | BODY MASS INDEX: 21.83 KG/M2 | OXYGEN SATURATION: 96 % | HEIGHT: 65 IN

## 2019-08-06 DIAGNOSIS — F33.42 RECURRENT MAJOR DEPRESSIVE DISORDER, IN FULL REMISSION (HCC): ICD-10-CM

## 2019-08-06 DIAGNOSIS — R39.15 URGENCY OF URINATION: ICD-10-CM

## 2019-08-06 DIAGNOSIS — I34.1 MVP (MITRAL VALVE PROLAPSE): ICD-10-CM

## 2019-08-06 DIAGNOSIS — E55.9 VITAMIN D DEFICIENCY: ICD-10-CM

## 2019-08-06 DIAGNOSIS — E78.2 MIXED HYPERLIPIDEMIA: ICD-10-CM

## 2019-08-06 DIAGNOSIS — R73.01 IMPAIRED FASTING GLUCOSE: Primary | ICD-10-CM

## 2019-08-06 DIAGNOSIS — E04.9 THYROID GOITER: ICD-10-CM

## 2019-08-06 PROCEDURE — 99214 OFFICE O/P EST MOD 30 MIN: CPT | Performed by: PHYSICIAN ASSISTANT

## 2019-08-06 RX ORDER — PROPRANOLOL HCL 60 MG
60 CAPSULE, EXTENDED RELEASE 24HR ORAL DAILY
Qty: 90 CAPSULE | Refills: 3 | Status: SHIPPED | OUTPATIENT
Start: 2019-08-06 | End: 2020-02-15

## 2019-08-06 RX ORDER — FESOTERODINE FUMARATE 4 MG/1
4 TABLET, FILM COATED, EXTENDED RELEASE ORAL
Qty: 90 TABLET | Refills: 3 | Status: SHIPPED | OUTPATIENT
Start: 2019-08-06

## 2019-08-06 RX ORDER — METFORMIN HYDROCHLORIDE 500 MG/1
TABLET, EXTENDED RELEASE ORAL
Qty: 180 TABLET | Refills: 3 | Status: SHIPPED | OUTPATIENT
Start: 2019-08-06

## 2019-08-06 RX ORDER — ATORVASTATIN CALCIUM 10 MG/1
10 TABLET, FILM COATED ORAL DAILY
Qty: 90 TABLET | Refills: 3 | Status: SHIPPED | OUTPATIENT
Start: 2019-08-06 | End: 2020-01-30

## 2019-08-06 NOTE — PROGRESS NOTES
Subjective   Carin Rodriguez is a 69 y.o. female.     History of Present Illness   Carin Rodriguez 69 y.o. female who presents today for routine follow up check and medication refills.  she has a history of   Patient Active Problem List   Diagnosis   • Impaired fasting glucose   • Recurrent major depressive disorder, in full remission (CMS/HCC)   • Hyperlipidemia   • Thyroid goiter   • Vaginal atrophy   • Osteopenia   • MVP (mitral valve prolapse)   • Spinal stenosis of lumbar region without neurogenic claudication   • Urgency of urination   .  Since the last visit, she has overall felt well.  She has Impaired fasting glucose and will continue close lab follow up to watch for DMII, Hyperlipidemia and is well controlled on medication and Vitamin D deficiency and will update labs to confirm level is at goal >30.  she has been compliant with current medications have reviewed them.  The patient denies medication side effects.  I will order labs for end Dec  Echo due Jan--MVP  Results for orders placed or performed in visit on 07/26/19   Comprehensive Metabolic Panel   Result Value Ref Range    Glucose 83 65 - 99 mg/dL    BUN 20 8 - 23 mg/dL    Creatinine 0.72 0.57 - 1.00 mg/dL    eGFR Non African Am 81 >60 mL/min/1.73    eGFR African Am 98 >60 mL/min/1.73    BUN/Creatinine Ratio 27.8 (H) 7.0 - 25.0    Sodium 140 136 - 145 mmol/L    Potassium 4.6 3.5 - 5.2 mmol/L    Chloride 100 98 - 107 mmol/L    Total CO2 28.0 22.0 - 29.0 mmol/L    Calcium 9.3 8.6 - 10.5 mg/dL    Total Protein 7.2 6.0 - 8.5 g/dL    Albumin 4.50 3.50 - 5.20 g/dL    Globulin 2.7 gm/dL    A/G Ratio 1.7 g/dL    Total Bilirubin 0.2 0.2 - 1.2 mg/dL    Alkaline Phosphatase 69 39 - 117 U/L    AST (SGOT) 15 1 - 32 U/L    ALT (SGPT) 11 1 - 33 U/L   Hemoglobin A1c   Result Value Ref Range    Hemoglobin A1C 6.00 (H) 4.80 - 5.60 %   I will refill beta blocker for the palpitations and works well  She sees GI and for IBS; on Colestid--new  she has appt with   Forwith to check on thyroid goiter    Has MVP and did echo 1-8-18 and d/w DR Guillen; repeat echo 2 years----due Jan---2010  DR Swain is psych  The following portions of the patient's history were reviewed and updated as appropriate: allergies, current medications, past family history, past medical history, past social history, past surgical history and problem list.    Review of Systems   Constitutional: Negative for activity change, appetite change and unexpected weight change.   HENT: Negative for nosebleeds and trouble swallowing.    Eyes: Negative for pain and visual disturbance.   Respiratory: Negative for chest tightness, shortness of breath and wheezing.    Cardiovascular: Negative for chest pain and palpitations.   Gastrointestinal: Negative for abdominal pain and blood in stool.   Endocrine: Negative.    Genitourinary: Negative for difficulty urinating and hematuria.   Musculoskeletal: Negative for joint swelling.   Skin: Negative for color change and rash.   Allergic/Immunologic: Negative.    Neurological: Negative for syncope and speech difficulty.   Hematological: Negative for adenopathy.   Psychiatric/Behavioral: Negative for agitation and confusion.   All other systems reviewed and are negative.      Objective   Physical Exam   Constitutional: She is oriented to person, place, and time. She appears well-developed and well-nourished. No distress.   HENT:   Head: Normocephalic and atraumatic.   Right Ear: External ear normal.   Left Ear: External ear normal.   Nose: Nose normal.   Mouth/Throat: Oropharynx is clear and moist. No oropharyngeal exudate.   Eyes: Conjunctivae and EOM are normal. Pupils are equal, round, and reactive to light. No scleral icterus.   Neck: Normal range of motion. Neck supple. Thyromegaly present.   Cardiovascular: Normal rate, regular rhythm, normal heart sounds and intact distal pulses.   No murmur heard.  Pulmonary/Chest: Effort normal and breath sounds normal. No respiratory  distress. She has no wheezes. She has no rales.   Abdominal: Soft.   Musculoskeletal: Normal range of motion. She exhibits no deformity.   Lymphadenopathy:     She has no cervical adenopathy.   Neurological: She is alert and oriented to person, place, and time. Coordination normal.   Skin: Skin is warm and dry.   Psychiatric: She has a normal mood and affect. Her behavior is normal. Judgment and thought content normal.   Nursing note and vitals reviewed.      Assessment/Plan   Carin was seen today for med management and hyperlipidemia.    Diagnoses and all orders for this visit:    Impaired fasting glucose  -     Comprehensive metabolic panel; Future  -     Lipid panel; Future  -     CBC and Differential; Future  -     TSH; Future  -     Hemoglobin A1c; Future  -     T3, Free; Future  -     T4, Free; Future  -     Vitamin B12; Future  -     Folate; Future  -     Vitamin D 25 Hydroxy; Future  -     Adult Transthoracic Echo Complete W/ Cont if Necessary Per Protocol; Future    Mixed hyperlipidemia  -     Comprehensive metabolic panel; Future  -     Lipid panel; Future  -     CBC and Differential; Future  -     TSH; Future  -     Hemoglobin A1c; Future  -     T3, Free; Future  -     T4, Free; Future  -     Vitamin B12; Future  -     Folate; Future  -     Vitamin D 25 Hydroxy; Future  -     Adult Transthoracic Echo Complete W/ Cont if Necessary Per Protocol; Future    Recurrent major depressive disorder, in full remission (CMS/HCC)  -     Comprehensive metabolic panel; Future  -     Lipid panel; Future  -     CBC and Differential; Future  -     TSH; Future  -     Hemoglobin A1c; Future  -     T3, Free; Future  -     T4, Free; Future  -     Vitamin B12; Future  -     Folate; Future  -     Vitamin D 25 Hydroxy; Future  -     Adult Transthoracic Echo Complete W/ Cont if Necessary Per Protocol; Future    Thyroid goiter  -     Comprehensive metabolic panel; Future  -     Lipid panel; Future  -     CBC and Differential;  Future  -     TSH; Future  -     Hemoglobin A1c; Future  -     T3, Free; Future  -     T4, Free; Future  -     Vitamin B12; Future  -     Folate; Future  -     Vitamin D 25 Hydroxy; Future  -     Adult Transthoracic Echo Complete W/ Cont if Necessary Per Protocol; Future    Vitamin D deficiency  -     Comprehensive metabolic panel; Future  -     Lipid panel; Future  -     CBC and Differential; Future  -     TSH; Future  -     Hemoglobin A1c; Future  -     T3, Free; Future  -     T4, Free; Future  -     Vitamin B12; Future  -     Folate; Future  -     Vitamin D 25 Hydroxy; Future  -     Adult Transthoracic Echo Complete W/ Cont if Necessary Per Protocol; Future    MVP (mitral valve prolapse)  -     Adult Transthoracic Echo Complete W/ Cont if Necessary Per Protocol; Future    Urgency of urination    Other orders  -     atorvastatin (LIPITOR) 10 MG tablet; Take 1 tablet by mouth Daily. For cholesterol  -     propranolol LA (INDERAL LA) 60 MG 24 hr capsule; Take 1 capsule by mouth Daily. For palpitations  -     metFORMIN ER (GLUCOPHAGE-XR) 500 MG 24 hr tablet; 2 PO daily with food for impaired fasting glucose  -     TOVIAZ 4 MG tablet sustained-release 24 hour tablet; Take 1 tablet by mouth Daily. For urgency    f/u DR Amos for thyroid goiter  Sees GI  Sees Dr Swain psych  Echo due Raffy MVP  Labs end Dec  In summary, Carin Rodriguez, was seen today.  she was seen for  Impaired fasting glucose and will continue close lab follow up to watch for DMII, Hyperlipidemia and is well controlled on medication and Vitamin D deficiency and will update labs to confirm level is at goal >30,

## 2019-08-06 NOTE — PATIENT INSTRUCTIONS

## 2019-09-12 ENCOUNTER — HOSPITAL ENCOUNTER (OUTPATIENT)
Dept: CARDIOLOGY | Facility: HOSPITAL | Age: 69
Discharge: HOME | End: 2019-09-12
Admitting: PHYSICIAN ASSISTANT

## 2019-09-12 VITALS
WEIGHT: 131 LBS | HEART RATE: 74 BPM | SYSTOLIC BLOOD PRESSURE: 90 MMHG | HEIGHT: 65 IN | DIASTOLIC BLOOD PRESSURE: 54 MMHG | BODY MASS INDEX: 21.83 KG/M2

## 2019-09-12 DIAGNOSIS — E78.2 MIXED HYPERLIPIDEMIA: ICD-10-CM

## 2019-09-12 DIAGNOSIS — E55.9 VITAMIN D DEFICIENCY: ICD-10-CM

## 2019-09-12 DIAGNOSIS — R73.01 IMPAIRED FASTING GLUCOSE: ICD-10-CM

## 2019-09-12 DIAGNOSIS — E04.9 THYROID GOITER: ICD-10-CM

## 2019-09-12 DIAGNOSIS — I34.1 MVP (MITRAL VALVE PROLAPSE): ICD-10-CM

## 2019-09-12 DIAGNOSIS — F33.42 RECURRENT MAJOR DEPRESSIVE DISORDER, IN FULL REMISSION (HCC): ICD-10-CM

## 2019-09-12 LAB
AORTIC ROOT ANNULUS: 1.9 CM
BH CV ECHO MEAS - ACS: 1.7 CM
BH CV ECHO MEAS - AO MAX PG (FULL): 1.5 MMHG
BH CV ECHO MEAS - AO MAX PG: 7.5 MMHG
BH CV ECHO MEAS - AO MEAN PG (FULL): 1.1 MMHG
BH CV ECHO MEAS - AO MEAN PG: 4.2 MMHG
BH CV ECHO MEAS - AO ROOT AREA (BSA CORRECTED): 1.6
BH CV ECHO MEAS - AO ROOT AREA: 5.3 CM^2
BH CV ECHO MEAS - AO ROOT DIAM: 2.6 CM
BH CV ECHO MEAS - AO V2 MAX: 136.8 CM/SEC
BH CV ECHO MEAS - AO V2 MEAN: 95.6 CM/SEC
BH CV ECHO MEAS - AO V2 VTI: 30.3 CM
BH CV ECHO MEAS - AVA(I,A): 2.6 CM^2
BH CV ECHO MEAS - AVA(I,D): 2.6 CM^2
BH CV ECHO MEAS - AVA(V,A): 2.6 CM^2
BH CV ECHO MEAS - AVA(V,D): 2.6 CM^2
BH CV ECHO MEAS - BSA(HAYCOCK): 1.7 M^2
BH CV ECHO MEAS - BSA: 1.7 M^2
BH CV ECHO MEAS - BZI_BMI: 21.8 KILOGRAMS/M^2
BH CV ECHO MEAS - BZI_METRIC_HEIGHT: 165.1 CM
BH CV ECHO MEAS - BZI_METRIC_WEIGHT: 59.4 KG
BH CV ECHO MEAS - EDV(MOD-SP2): 50 ML
BH CV ECHO MEAS - EDV(MOD-SP4): 62 ML
BH CV ECHO MEAS - EDV(TEICH): 101.7 ML
BH CV ECHO MEAS - EF(CUBED): 83.4 %
BH CV ECHO MEAS - EF(MOD-BP): 67 %
BH CV ECHO MEAS - EF(MOD-SP2): 64 %
BH CV ECHO MEAS - EF(MOD-SP4): 67.7 %
BH CV ECHO MEAS - EF(TEICH): 76.4 %
BH CV ECHO MEAS - ESV(MOD-SP2): 18 ML
BH CV ECHO MEAS - ESV(MOD-SP4): 20 ML
BH CV ECHO MEAS - ESV(TEICH): 24 ML
BH CV ECHO MEAS - FS: 45.1 %
BH CV ECHO MEAS - IVS/LVPW: 0.96
BH CV ECHO MEAS - IVSD: 0.88 CM
BH CV ECHO MEAS - LAT PEAK E' VEL: 7 CM/SEC
BH CV ECHO MEAS - LV DIASTOLIC VOL/BSA (35-75): 37.5 ML/M^2
BH CV ECHO MEAS - LV MASS(C)D: 142.6 GRAMS
BH CV ECHO MEAS - LV MASS(C)DI: 86.3 GRAMS/M^2
BH CV ECHO MEAS - LV MAX PG: 6 MMHG
BH CV ECHO MEAS - LV MEAN PG: 3.1 MMHG
BH CV ECHO MEAS - LV SYSTOLIC VOL/BSA (12-30): 12.1 ML/M^2
BH CV ECHO MEAS - LV V1 MAX: 122.7 CM/SEC
BH CV ECHO MEAS - LV V1 MEAN: 83 CM/SEC
BH CV ECHO MEAS - LV V1 VTI: 27.8 CM
BH CV ECHO MEAS - LVIDD: 4.7 CM
BH CV ECHO MEAS - LVIDS: 2.6 CM
BH CV ECHO MEAS - LVLD AP2: 6 CM
BH CV ECHO MEAS - LVLD AP4: 6.3 CM
BH CV ECHO MEAS - LVLS AP2: 5.1 CM
BH CV ECHO MEAS - LVLS AP4: 5 CM
BH CV ECHO MEAS - LVOT AREA (M): 2.8 CM^2
BH CV ECHO MEAS - LVOT AREA: 2.9 CM^2
BH CV ECHO MEAS - LVOT DIAM: 1.9 CM
BH CV ECHO MEAS - LVPWD: 0.92 CM
BH CV ECHO MEAS - MED PEAK E' VEL: 5 CM/SEC
BH CV ECHO MEAS - MV A DUR: 0.12 SEC
BH CV ECHO MEAS - MV A MAX VEL: 95.6 CM/SEC
BH CV ECHO MEAS - MV DEC SLOPE: 455.3 CM/SEC^2
BH CV ECHO MEAS - MV DEC TIME: 0.25 SEC
BH CV ECHO MEAS - MV E MAX VEL: 98.5 CM/SEC
BH CV ECHO MEAS - MV E/A: 1
BH CV ECHO MEAS - MV MAX PG: 4.8 MMHG
BH CV ECHO MEAS - MV MEAN PG: 2 MMHG
BH CV ECHO MEAS - MV P1/2T MAX VEL: 104.2 CM/SEC
BH CV ECHO MEAS - MV P1/2T: 67.1 MSEC
BH CV ECHO MEAS - MV V2 MAX: 109.7 CM/SEC
BH CV ECHO MEAS - MV V2 MEAN: 66.2 CM/SEC
BH CV ECHO MEAS - MV V2 VTI: 31 CM
BH CV ECHO MEAS - MVA P1/2T LCG: 2.1 CM^2
BH CV ECHO MEAS - MVA(P1/2T): 3.3 CM^2
BH CV ECHO MEAS - MVA(VTI): 2.6 CM^2
BH CV ECHO MEAS - PA MAX PG (FULL): 2.2 MMHG
BH CV ECHO MEAS - PA MAX PG: 4 MMHG
BH CV ECHO MEAS - PA V2 MAX: 99.9 CM/SEC
BH CV ECHO MEAS - PULM A REVS DUR: 0.13 SEC
BH CV ECHO MEAS - PULM A REVS VEL: 35.4 CM/SEC
BH CV ECHO MEAS - PULM DIAS VEL: 49 CM/SEC
BH CV ECHO MEAS - PULM S/D: 1.4
BH CV ECHO MEAS - PULM SYS VEL: 68.4 CM/SEC
BH CV ECHO MEAS - PVA(V,A): 2.9 CM^2
BH CV ECHO MEAS - PVA(V,D): 2.9 CM^2
BH CV ECHO MEAS - QP/QS: 0.78
BH CV ECHO MEAS - RAP SYSTOLE: 3 MMHG
BH CV ECHO MEAS - RV MAX PG: 1.8 MMHG
BH CV ECHO MEAS - RV MEAN PG: 0.97 MMHG
BH CV ECHO MEAS - RV V1 MAX: 66.9 CM/SEC
BH CV ECHO MEAS - RV V1 MEAN: 45.1 CM/SEC
BH CV ECHO MEAS - RV V1 VTI: 14.3 CM
BH CV ECHO MEAS - RVOT AREA: 4.4 CM^2
BH CV ECHO MEAS - RVOT DIAM: 2.4 CM
BH CV ECHO MEAS - RVSP: 20 MMHG
BH CV ECHO MEAS - SI(AO): 98 ML/M^2
BH CV ECHO MEAS - SI(CUBED): 52 ML/M^2
BH CV ECHO MEAS - SI(LVOT): 48.4 ML/M^2
BH CV ECHO MEAS - SI(MOD-SP2): 19.4 ML/M^2
BH CV ECHO MEAS - SI(MOD-SP4): 25.4 ML/M^2
BH CV ECHO MEAS - SI(TEICH): 47 ML/M^2
BH CV ECHO MEAS - SV(AO): 162 ML
BH CV ECHO MEAS - SV(CUBED): 85.9 ML
BH CV ECHO MEAS - SV(LVOT): 79.9 ML
BH CV ECHO MEAS - SV(MOD-SP2): 32 ML
BH CV ECHO MEAS - SV(MOD-SP4): 42 ML
BH CV ECHO MEAS - SV(RVOT): 62.3 ML
BH CV ECHO MEAS - SV(TEICH): 77.7 ML
BH CV ECHO MEAS - TAPSE (>1.6): 2.3 CM2
BH CV ECHO MEAS - TR MAX VEL: 204.1 CM/SEC
BH CV ECHO MEASUREMENTS AVERAGE E/E' RATIO: 16.42
BH CV XLRA - RV BASE: 3.4 CM
BH CV XLRA - TDI S': 15 CM/SEC
LEFT ATRIUM VOLUME INDEX: 20 ML/M2
SINUS: 3 CM
STJ: 2.8 CM

## 2019-09-12 PROCEDURE — 93306 TTE W/DOPPLER COMPLETE: CPT | Performed by: INTERNAL MEDICINE

## 2019-09-12 PROCEDURE — 93306 TTE W/DOPPLER COMPLETE: CPT

## 2019-09-13 DIAGNOSIS — I34.1 MVP (MITRAL VALVE PROLAPSE): Primary | ICD-10-CM

## 2019-11-11 ENCOUNTER — TELEPHONE (OUTPATIENT)
Dept: OBSTETRICS AND GYNECOLOGY | Age: 69
End: 2019-11-11

## 2019-11-12 ENCOUNTER — OFFICE VISIT (OUTPATIENT)
Dept: CARDIOLOGY | Facility: CLINIC | Age: 69
End: 2019-11-12

## 2019-11-12 VITALS
SYSTOLIC BLOOD PRESSURE: 102 MMHG | HEART RATE: 69 BPM | BODY MASS INDEX: 22.86 KG/M2 | HEIGHT: 65 IN | DIASTOLIC BLOOD PRESSURE: 68 MMHG | WEIGHT: 137.2 LBS

## 2019-11-12 DIAGNOSIS — I34.1 MVP (MITRAL VALVE PROLAPSE): Primary | ICD-10-CM

## 2019-11-12 PROCEDURE — 99203 OFFICE O/P NEW LOW 30 MIN: CPT | Performed by: INTERNAL MEDICINE

## 2019-11-12 PROCEDURE — 93000 ELECTROCARDIOGRAM COMPLETE: CPT | Performed by: INTERNAL MEDICINE

## 2019-11-18 NOTE — PROGRESS NOTES
Subjective:     Encounter Date:11/12/2019      Patient ID: Carin Rodriguez is a 69 y.o. female.    Chief Complaint:  Mitral Valve Prolapse   This is a chronic problem. The problem has been unchanged.         DeaCristiano thank you for referring this patient for evaluation of mitral valve prolapse.  Patient underwent an echocardiogram that is noted below.  She was found to have mitral valve prolapse and was referred for establishment of care.  Clinically she is doing well no chest pain shortness of breath edema lightheadedness fatigue syncope or near syncope.    Review of Systems   All other systems reviewed and are negative.    Interpretation Summary     · Left ventricular systolic function is normal. Calculated EF = 67%. Estimated EF was in agreement with the calculated EF. Normal left ventricular cavity size and wall thickness noted. All left ventricular wall segments contract normally.  · Left ventricular diastolic dysfunction is noted (grade II w/high LAP) consistent with pseudonormalization.  · The aortic valve is abnormal in structure. The valve exhibits sclerosis.  · There is mild bileaflet mitral valve thickening present.  · Trace-to-mild mitral valve regurgitation is present.            ECG 12 Lead  Date/Time: 11/12/2019 12:48 PM  Performed by: Ovi Guillen MD  Authorized by: Ovi Guillen MD   Comparison: compared with previous ECG from 4/4/2016  Similar to previous ECG  Rhythm: sinus rhythm    Clinical impression: normal ECG               Objective:     Physical Exam   Constitutional: She is oriented to person, place, and time. She appears well-developed.   HENT:   Head: Normocephalic.   Eyes: Conjunctivae are normal.   Neck: Normal range of motion.   Cardiovascular: Normal rate, regular rhythm and normal heart sounds.   Pulmonary/Chest: Breath sounds normal.   Abdominal: Soft. Bowel sounds are normal.   Musculoskeletal: Normal range of motion. She exhibits no edema.   Neurological: She is  alert and oriented to person, place, and time.   Skin: Skin is warm and dry.   Psychiatric: She has a normal mood and affect. Her behavior is normal.   Vitals reviewed.      Lab Review:       Assessment:          Diagnosis Plan   1. MVP (mitral valve prolapse)            Plan:         1.  Mitral valve prolapse.  Patient clinically is doing fine.  It is very mild at this point I would not intervene at all.  I would repeat her echocardiogram about 2 years sooner course if she develops any type of symptoms.  Current recommendations are no antibiotics that has not changed in several years and I did review several issues associated mitral valve prolapse.  Patient was told to come back in about 2 years sooner if issues

## 2019-12-23 ENCOUNTER — RESULTS ENCOUNTER (OUTPATIENT)
Dept: FAMILY MEDICINE CLINIC | Facility: CLINIC | Age: 69
End: 2019-12-23

## 2019-12-23 DIAGNOSIS — F33.42 RECURRENT MAJOR DEPRESSIVE DISORDER, IN FULL REMISSION (HCC): ICD-10-CM

## 2019-12-23 DIAGNOSIS — R73.01 IMPAIRED FASTING GLUCOSE: ICD-10-CM

## 2019-12-23 DIAGNOSIS — E55.9 VITAMIN D DEFICIENCY: ICD-10-CM

## 2019-12-23 DIAGNOSIS — E04.9 THYROID GOITER: ICD-10-CM

## 2019-12-23 DIAGNOSIS — E78.2 MIXED HYPERLIPIDEMIA: ICD-10-CM

## 2019-12-26 RX ORDER — CLOTRIMAZOLE AND BETAMETHASONE DIPROPIONATE 10; .64 MG/G; MG/G
CREAM TOPICAL
Qty: 15 G | Refills: 0 | Status: SHIPPED | OUTPATIENT
Start: 2019-12-26 | End: 2020-01-21

## 2020-01-10 LAB
25(OH)D3+25(OH)D2 SERPL-MCNC: 46.4 NG/ML (ref 30–100)
ALBUMIN SERPL-MCNC: 4.5 G/DL (ref 3.5–5.2)
ALBUMIN/GLOB SERPL: 1.7 G/DL
ALP SERPL-CCNC: 68 U/L (ref 39–117)
ALT SERPL-CCNC: 16 U/L (ref 1–33)
AST SERPL-CCNC: 13 U/L (ref 1–32)
BASOPHILS # BLD AUTO: 0.05 10*3/MM3 (ref 0–0.2)
BASOPHILS NFR BLD AUTO: 1 % (ref 0–1.5)
BILIRUB SERPL-MCNC: 0.2 MG/DL (ref 0.2–1.2)
BUN SERPL-MCNC: 22 MG/DL (ref 8–23)
BUN/CREAT SERPL: 31.4 (ref 7–25)
CALCIUM SERPL-MCNC: 9.2 MG/DL (ref 8.6–10.5)
CHLORIDE SERPL-SCNC: 101 MMOL/L (ref 98–107)
CHOLEST SERPL-MCNC: 166 MG/DL (ref 0–200)
CO2 SERPL-SCNC: 27.5 MMOL/L (ref 22–29)
CREAT SERPL-MCNC: 0.7 MG/DL (ref 0.57–1)
EOSINOPHIL # BLD AUTO: 0.16 10*3/MM3 (ref 0–0.4)
EOSINOPHIL NFR BLD AUTO: 3.4 % (ref 0.3–6.2)
ERYTHROCYTE [DISTWIDTH] IN BLOOD BY AUTOMATED COUNT: 13 % (ref 12.3–15.4)
FOLATE SERPL-MCNC: >20 NG/ML (ref 4.78–24.2)
GLOBULIN SER CALC-MCNC: 2.6 GM/DL
GLUCOSE SERPL-MCNC: 101 MG/DL (ref 65–99)
HBA1C MFR BLD: 6.2 % (ref 4.8–5.6)
HCT VFR BLD AUTO: 38.1 % (ref 34–46.6)
HDLC SERPL-MCNC: 56 MG/DL (ref 40–60)
HGB BLD-MCNC: 12.9 G/DL (ref 12–15.9)
IMM GRANULOCYTES # BLD AUTO: 0.01 10*3/MM3 (ref 0–0.05)
IMM GRANULOCYTES NFR BLD AUTO: 0.2 % (ref 0–0.5)
LDLC SERPL CALC-MCNC: 87 MG/DL (ref 0–100)
LYMPHOCYTES # BLD AUTO: 1.7 10*3/MM3 (ref 0.7–3.1)
LYMPHOCYTES NFR BLD AUTO: 35.6 % (ref 19.6–45.3)
MCH RBC QN AUTO: 29.6 PG (ref 26.6–33)
MCHC RBC AUTO-ENTMCNC: 33.9 G/DL (ref 31.5–35.7)
MCV RBC AUTO: 87.4 FL (ref 79–97)
MONOCYTES # BLD AUTO: 0.49 10*3/MM3 (ref 0.1–0.9)
MONOCYTES NFR BLD AUTO: 10.3 % (ref 5–12)
NEUTROPHILS # BLD AUTO: 2.36 10*3/MM3 (ref 1.7–7)
NEUTROPHILS NFR BLD AUTO: 49.5 % (ref 42.7–76)
NRBC BLD AUTO-RTO: 0 /100 WBC (ref 0–0.2)
PLATELET # BLD AUTO: 199 10*3/MM3 (ref 140–450)
POTASSIUM SERPL-SCNC: 5.1 MMOL/L (ref 3.5–5.2)
PROT SERPL-MCNC: 7.1 G/DL (ref 6–8.5)
RBC # BLD AUTO: 4.36 10*6/MM3 (ref 3.77–5.28)
SODIUM SERPL-SCNC: 142 MMOL/L (ref 136–145)
T3FREE SERPL-MCNC: 3 PG/ML (ref 2–4.4)
T4 FREE SERPL-MCNC: 1.08 NG/DL (ref 0.93–1.7)
TRIGL SERPL-MCNC: 117 MG/DL (ref 0–150)
TSH SERPL DL<=0.005 MIU/L-ACNC: 2.74 UIU/ML (ref 0.27–4.2)
VIT B12 SERPL-MCNC: 896 PG/ML (ref 211–946)
VLDLC SERPL CALC-MCNC: 23.4 MG/DL
WBC # BLD AUTO: 4.77 10*3/MM3 (ref 3.4–10.8)

## 2020-01-21 RX ORDER — CLOTRIMAZOLE AND BETAMETHASONE DIPROPIONATE 10; .64 MG/G; MG/G
CREAM TOPICAL 2 TIMES DAILY
Qty: 15 G | Refills: 0 | Status: SHIPPED | OUTPATIENT
Start: 2020-01-21

## 2020-01-30 RX ORDER — ATORVASTATIN CALCIUM 10 MG/1
TABLET, FILM COATED ORAL
Qty: 90 TABLET | Refills: 1 | Status: SHIPPED | OUTPATIENT
Start: 2020-01-30

## 2020-02-15 RX ORDER — PROPRANOLOL HCL 60 MG
CAPSULE, EXTENDED RELEASE 24HR ORAL
Qty: 90 CAPSULE | Refills: 0 | Status: SHIPPED | OUTPATIENT
Start: 2020-02-15

## 2020-05-01 RX ORDER — METFORMIN HYDROCHLORIDE 500 MG/1
TABLET, EXTENDED RELEASE ORAL
Qty: 180 TABLET | Refills: 3 | OUTPATIENT
Start: 2020-05-01

## 2021-03-18 RX ORDER — MONTELUKAST SODIUM 4 MG/1
TABLET, CHEWABLE ORAL
Qty: 60 TABLET | Refills: 11 | OUTPATIENT
Start: 2021-03-18

## 2021-03-19 ENCOUNTER — BULK ORDERING (OUTPATIENT)
Dept: CASE MANAGEMENT | Facility: OTHER | Age: 71
End: 2021-03-19

## 2021-03-19 DIAGNOSIS — Z23 IMMUNIZATION DUE: ICD-10-CM

## 2021-09-21 ENCOUNTER — IMPORTED ENCOUNTER (OUTPATIENT)
Dept: URBAN - METROPOLITAN AREA CLINIC 9 | Facility: CLINIC | Age: 71
End: 2021-09-21

## 2021-10-11 NOTE — TELEPHONE ENCOUNTER
----- Message from Vernell Carrasquillo MD sent at 11/22/2018  6:52 PM EST -----  Please call patient and notify of normal results of pap smear   Xolair Pregnancy And Lactation Text: This medication is Pregnancy Category B and is considered safe during pregnancy. This medication is excreted in breast milk.

## 2021-10-16 ASSESSMENT — VISUAL ACUITY
OS_CC: 20/20 - SN
OD_SC: 20/25 SN
OD_CC: 20/20 SN
OS_SC: 20/30 - SN

## 2021-10-16 ASSESSMENT — TONOMETRY
OD_IOP_MMHG: 14
OS_IOP_MMHG: 14

## 2021-11-08 NOTE — TELEPHONE ENCOUNTER
Pt did some research on the cost of Intrarosa 6.5 mg and found that [facility] was least expensive. Please send in new Rx to pharm on file   Dupixent Pregnancy And Lactation Text: This medication likely crosses the placenta but the risk for the fetus is uncertain. This medication is excreted in breast milk.

## 2022-07-02 RX ORDER — CONJUGATED ESTROGENS 0.62 MG/G
CREAM VAGINAL
COMMUNITY
Start: 2021-08-19 | End: 2022-08-02 | Stop reason: SDUPTHER

## 2022-07-02 RX ORDER — FESOTERODINE FUMARATE 4 MG/1
TABLET, FILM COATED, EXTENDED RELEASE ORAL
COMMUNITY

## 2022-07-02 RX ORDER — ATORVASTATIN CALCIUM 40 MG/1
TABLET, FILM COATED ORAL
COMMUNITY

## 2022-07-02 RX ORDER — IBUPROFEN 200 MG
CAPSULE ORAL
COMMUNITY

## 2022-07-02 RX ORDER — CLONAZEPAM 0.5 MG/1
TABLET ORAL
COMMUNITY

## 2022-07-02 RX ORDER — LANOLIN ALCOHOL/MO/W.PET/CERES
CREAM (GRAM) TOPICAL
COMMUNITY

## 2022-07-19 ENCOUNTER — ESTABLISHED PATIENT (OUTPATIENT)
Dept: URBAN - METROPOLITAN AREA CLINIC 9 | Facility: CLINIC | Age: 72
End: 2022-07-19

## 2022-07-19 DIAGNOSIS — H33.312: ICD-10-CM

## 2022-07-19 DIAGNOSIS — H04.123: ICD-10-CM

## 2022-07-19 DIAGNOSIS — D31.31: ICD-10-CM

## 2022-07-19 DIAGNOSIS — D31.32: ICD-10-CM

## 2022-07-19 DIAGNOSIS — E11.9: ICD-10-CM

## 2022-07-19 DIAGNOSIS — H43.813: ICD-10-CM

## 2022-07-19 DIAGNOSIS — G43.B0: ICD-10-CM

## 2022-07-19 DIAGNOSIS — H35.372: ICD-10-CM

## 2022-07-19 DIAGNOSIS — Z96.1: ICD-10-CM

## 2022-07-19 PROCEDURE — 92134 CPTRZ OPH DX IMG PST SGM RTA: CPT

## 2022-07-19 PROCEDURE — 92015 DETERMINE REFRACTIVE STATE: CPT

## 2022-07-19 PROCEDURE — 92014 COMPRE OPH EXAM EST PT 1/>: CPT

## 2022-07-19 ASSESSMENT — VISUAL ACUITY
OS_GLARE: 20/40
OU_SC: 20/20-1
OD_SC: 20/20-1
OD_GLARE: 20/40
OS_SC: 20/25-1

## 2022-07-19 ASSESSMENT — TONOMETRY
OD_IOP_MMHG: 8
OS_IOP_MMHG: 8

## 2022-08-02 PROBLEM — E78.00 ELEVATED CHOLESTEROL: Status: ACTIVE | Noted: 2022-08-02

## 2022-08-02 PROBLEM — F32.A MILD DEPRESSION: Status: ACTIVE | Noted: 2022-08-02

## 2022-08-02 PROBLEM — N95.9 MENOPAUSAL AND POSTMENOPAUSAL DISORDER: Status: ACTIVE | Noted: 2022-08-02

## 2022-08-02 PROBLEM — M47.816 LUMBAR SPONDYLOSIS: Status: ACTIVE | Noted: 2022-08-02

## 2022-08-02 PROBLEM — N32.81 OVERACTIVE BLADDER: Status: ACTIVE | Noted: 2022-08-02

## 2022-08-02 PROBLEM — N94.10 DYSPAREUNIA IN FEMALE: Status: ACTIVE | Noted: 2022-08-02

## 2022-08-02 PROBLEM — F41.8 ANXIETY ABOUT HEALTH: Status: ACTIVE | Noted: 2022-08-02

## 2023-08-09 ENCOUNTER — ESTABLISHED PATIENT (OUTPATIENT)
Dept: URBAN - METROPOLITAN AREA CLINIC 14 | Facility: CLINIC | Age: 73
End: 2023-08-09

## 2023-08-09 DIAGNOSIS — E11.9: ICD-10-CM

## 2023-08-09 DIAGNOSIS — H04.123: ICD-10-CM

## 2023-08-09 DIAGNOSIS — D31.32: ICD-10-CM

## 2023-08-09 DIAGNOSIS — D31.31: ICD-10-CM

## 2023-08-09 DIAGNOSIS — H35.372: ICD-10-CM

## 2023-08-09 DIAGNOSIS — H43.813: ICD-10-CM

## 2023-08-09 PROCEDURE — 99214 OFFICE O/P EST MOD 30 MIN: CPT

## 2023-08-09 PROCEDURE — 92015 DETERMINE REFRACTIVE STATE: CPT

## 2023-08-09 PROCEDURE — 92134 CPTRZ OPH DX IMG PST SGM RTA: CPT

## 2023-08-09 ASSESSMENT — TONOMETRY
OD_IOP_MMHG: 11
OS_IOP_MMHG: 10

## 2023-08-09 ASSESSMENT — VISUAL ACUITY
OS_SC: 20/30
OD_SC: 20/25

## 2024-08-26 ENCOUNTER — COMPREHENSIVE EXAM (OUTPATIENT)
Facility: LOCATION | Age: 74
End: 2024-08-26

## 2024-08-26 DIAGNOSIS — H02.881: ICD-10-CM

## 2024-08-26 DIAGNOSIS — E11.9: ICD-10-CM

## 2024-08-26 DIAGNOSIS — H02.884: ICD-10-CM

## 2024-08-26 DIAGNOSIS — H43.813: ICD-10-CM

## 2024-08-26 DIAGNOSIS — H35.372: ICD-10-CM

## 2024-08-26 DIAGNOSIS — H04.123: ICD-10-CM

## 2024-08-26 DIAGNOSIS — D31.32: ICD-10-CM

## 2024-08-26 DIAGNOSIS — D31.31: ICD-10-CM

## 2024-08-26 PROCEDURE — 92014 COMPRE OPH EXAM EST PT 1/>: CPT

## 2024-08-26 PROCEDURE — 92250 FUNDUS PHOTOGRAPHY W/I&R: CPT

## 2024-08-26 PROCEDURE — 92015 DETERMINE REFRACTIVE STATE: CPT

## 2024-08-26 PROCEDURE — 92134 CPTRZ OPH DX IMG PST SGM RTA: CPT | Mod: 59

## 2024-08-26 ASSESSMENT — VISUAL ACUITY
OD_SC: 20/25
OS_SC: J2
OD_SC: J16
OS_SC: 20/50

## 2024-08-26 ASSESSMENT — TONOMETRY
OS_IOP_MMHG: 13
OD_IOP_MMHG: 12